# Patient Record
Sex: FEMALE | Race: WHITE | Employment: OTHER | URBAN - NONMETROPOLITAN AREA
[De-identification: names, ages, dates, MRNs, and addresses within clinical notes are randomized per-mention and may not be internally consistent; named-entity substitution may affect disease eponyms.]

---

## 2017-01-09 ENCOUNTER — OFFICE VISIT (OUTPATIENT)
Dept: FAMILY MEDICINE CLINIC | Age: 52
End: 2017-01-09

## 2017-01-09 VITALS
SYSTOLIC BLOOD PRESSURE: 104 MMHG | BODY MASS INDEX: 22.68 KG/M2 | WEIGHT: 122 LBS | TEMPERATURE: 97.1 F | HEART RATE: 84 BPM | RESPIRATION RATE: 12 BRPM | DIASTOLIC BLOOD PRESSURE: 60 MMHG

## 2017-01-09 DIAGNOSIS — E55.9 VITAMIN D DEFICIENCY: ICD-10-CM

## 2017-01-09 DIAGNOSIS — F79 MENTAL RETARDATION: ICD-10-CM

## 2017-01-09 DIAGNOSIS — F20.9 SCHIZOPHRENIA, UNSPECIFIED TYPE (HCC): ICD-10-CM

## 2017-01-09 DIAGNOSIS — K59.04 CHRONIC IDIOPATHIC CONSTIPATION: ICD-10-CM

## 2017-01-09 DIAGNOSIS — F42.9 OCD (OBSESSIVE COMPULSIVE DISORDER): Primary | ICD-10-CM

## 2017-01-09 PROCEDURE — 99214 OFFICE O/P EST MOD 30 MIN: CPT | Performed by: NURSE PRACTITIONER

## 2017-01-09 RX ORDER — DIVALPROEX SODIUM 500 MG/1
TABLET, DELAYED RELEASE ORAL
Qty: 31 TABLET | Refills: 5 | Status: SHIPPED | OUTPATIENT
Start: 2017-01-09 | End: 2017-01-11 | Stop reason: SDUPTHER

## 2017-01-09 RX ORDER — THIORIDAZINE HYDROCHLORIDE 25 MG/1
TABLET, FILM COATED ORAL
Qty: 93 TABLET | Refills: 5 | Status: SHIPPED | OUTPATIENT
Start: 2017-01-09 | End: 2017-06-15 | Stop reason: SDUPTHER

## 2017-01-09 RX ORDER — SERTRALINE HYDROCHLORIDE 100 MG/1
TABLET, FILM COATED ORAL
Qty: 31 TABLET | Refills: 5 | Status: SHIPPED | OUTPATIENT
Start: 2017-01-09 | End: 2017-06-15 | Stop reason: SDUPTHER

## 2017-01-09 RX ORDER — DIVALPROEX SODIUM 250 MG/1
TABLET, DELAYED RELEASE ORAL
Qty: 62 TABLET | Refills: 5 | Status: SHIPPED | OUTPATIENT
Start: 2017-01-09 | End: 2017-01-11 | Stop reason: SDUPTHER

## 2017-01-09 ASSESSMENT — ENCOUNTER SYMPTOMS
GASTROINTESTINAL NEGATIVE: 1
RESPIRATORY NEGATIVE: 1

## 2017-01-11 ENCOUNTER — TELEPHONE (OUTPATIENT)
Dept: FAMILY MEDICINE CLINIC | Age: 52
End: 2017-01-11

## 2017-01-11 RX ORDER — DIVALPROEX SODIUM 250 MG/1
TABLET, DELAYED RELEASE ORAL
Qty: 62 TABLET | Refills: 5
Start: 2017-01-11 | End: 2017-04-05 | Stop reason: SDUPTHER

## 2017-01-11 RX ORDER — DIVALPROEX SODIUM 500 MG/1
TABLET, DELAYED RELEASE ORAL
Qty: 31 TABLET | Refills: 5
Start: 2017-01-11 | End: 2017-04-05 | Stop reason: DRUGHIGH

## 2017-01-13 RX ORDER — ERGOCALCIFEROL 1.25 MG/1
CAPSULE ORAL
Qty: 5 CAPSULE | Refills: 0 | Status: SHIPPED | OUTPATIENT
Start: 2017-01-13 | End: 2017-02-15 | Stop reason: SDUPTHER

## 2017-01-17 ENCOUNTER — NURSE ONLY (OUTPATIENT)
Dept: FAMILY MEDICINE CLINIC | Age: 52
End: 2017-01-17

## 2017-01-17 DIAGNOSIS — F42.9 OCD (OBSESSIVE COMPULSIVE DISORDER): ICD-10-CM

## 2017-01-17 LAB — VALPROIC ACID LEVEL: 110.8 UG/ML (ref 50–100)

## 2017-01-17 PROCEDURE — 36415 COLL VENOUS BLD VENIPUNCTURE: CPT | Performed by: NURSE PRACTITIONER

## 2017-01-18 ENCOUNTER — TELEPHONE (OUTPATIENT)
Dept: FAMILY MEDICINE CLINIC | Age: 52
End: 2017-01-18

## 2017-04-05 RX ORDER — DIVALPROEX SODIUM 250 MG/1
TABLET, DELAYED RELEASE ORAL
Qty: 62 TABLET | Refills: 5 | Status: SHIPPED | OUTPATIENT
Start: 2017-04-05 | End: 2017-08-25 | Stop reason: SDUPTHER

## 2017-04-10 ENCOUNTER — OFFICE VISIT (OUTPATIENT)
Dept: FAMILY MEDICINE CLINIC | Age: 52
End: 2017-04-10

## 2017-04-10 VITALS
HEIGHT: 61 IN | HEART RATE: 88 BPM | BODY MASS INDEX: 22.84 KG/M2 | DIASTOLIC BLOOD PRESSURE: 52 MMHG | WEIGHT: 121 LBS | OXYGEN SATURATION: 97 % | SYSTOLIC BLOOD PRESSURE: 106 MMHG

## 2017-04-10 DIAGNOSIS — F20.9 SCHIZOPHRENIA, UNSPECIFIED TYPE (HCC): ICD-10-CM

## 2017-04-10 DIAGNOSIS — E55.9 VITAMIN D DEFICIENCY: ICD-10-CM

## 2017-04-10 DIAGNOSIS — F42.9 OCD (OBSESSIVE COMPULSIVE DISORDER): ICD-10-CM

## 2017-04-10 DIAGNOSIS — Z12.31 ENCOUNTER FOR SCREENING MAMMOGRAM FOR MALIGNANT NEOPLASM OF BREAST: ICD-10-CM

## 2017-04-10 DIAGNOSIS — F79 MENTAL RETARDATION: ICD-10-CM

## 2017-04-10 DIAGNOSIS — D64.9 ANEMIA, UNSPECIFIED TYPE: ICD-10-CM

## 2017-04-10 DIAGNOSIS — E74.89 OTHER SPECIFIED DISORDERS OF CARBOHYDRATE METABOLISM (HCC): ICD-10-CM

## 2017-04-10 DIAGNOSIS — Z13.29 SCREENING FOR HYPOTHYROIDISM: ICD-10-CM

## 2017-04-10 DIAGNOSIS — E78.5 HYPERLIPIDEMIA, UNSPECIFIED HYPERLIPIDEMIA TYPE: ICD-10-CM

## 2017-04-10 DIAGNOSIS — Z13.9 SCREENING: Primary | ICD-10-CM

## 2017-04-10 LAB
TSH REFLEX: 0.47 UIU/ML (ref 0.27–4.2)
VALPROIC ACID LEVEL: 48.7 UG/ML (ref 50–100)

## 2017-04-10 PROCEDURE — 36415 COLL VENOUS BLD VENIPUNCTURE: CPT | Performed by: NURSE PRACTITIONER

## 2017-04-10 PROCEDURE — G8427 DOCREV CUR MEDS BY ELIG CLIN: HCPCS | Performed by: NURSE PRACTITIONER

## 2017-04-10 PROCEDURE — G8420 CALC BMI NORM PARAMETERS: HCPCS | Performed by: NURSE PRACTITIONER

## 2017-04-10 PROCEDURE — 99213 OFFICE O/P EST LOW 20 MIN: CPT | Performed by: NURSE PRACTITIONER

## 2017-04-10 PROCEDURE — 3014F SCREEN MAMMO DOC REV: CPT | Performed by: NURSE PRACTITIONER

## 2017-04-10 PROCEDURE — 1036F TOBACCO NON-USER: CPT | Performed by: NURSE PRACTITIONER

## 2017-04-10 PROCEDURE — 3017F COLORECTAL CA SCREEN DOC REV: CPT | Performed by: NURSE PRACTITIONER

## 2017-04-10 ASSESSMENT — PATIENT HEALTH QUESTIONNAIRE - PHQ9
SUM OF ALL RESPONSES TO PHQ QUESTIONS 1-9: 0
SUM OF ALL RESPONSES TO PHQ9 QUESTIONS 1 & 2: 0
1. LITTLE INTEREST OR PLEASURE IN DOING THINGS: 0
2. FEELING DOWN, DEPRESSED OR HOPELESS: 0

## 2017-04-10 ASSESSMENT — ENCOUNTER SYMPTOMS
RESPIRATORY NEGATIVE: 1
GASTROINTESTINAL NEGATIVE: 1

## 2017-04-11 ENCOUNTER — TELEPHONE (OUTPATIENT)
Dept: FAMILY MEDICINE CLINIC | Age: 52
End: 2017-04-11

## 2017-06-05 ENCOUNTER — HOSPITAL ENCOUNTER (OUTPATIENT)
Dept: MAMMOGRAPHY | Age: 52
Discharge: HOME | End: 2017-06-05
Attending: NURSE PRACTITIONER | Admitting: NURSE PRACTITIONER

## 2017-06-05 DIAGNOSIS — Z12.31 ENCOUNTER FOR SCREENING MAMMOGRAM FOR MALIGNANT NEOPLASM OF BREAST: ICD-10-CM

## 2017-06-05 DIAGNOSIS — Z13.9 SCREENING: ICD-10-CM

## 2017-06-07 DIAGNOSIS — R92.8 ABNORMAL MAMMOGRAM OF RIGHT BREAST: Primary | ICD-10-CM

## 2017-06-15 RX ORDER — THIORIDAZINE HYDROCHLORIDE 25 MG/1
TABLET, FILM COATED ORAL
Qty: 93 TABLET | Refills: 3 | Status: SHIPPED | OUTPATIENT
Start: 2017-06-15 | End: 2017-10-05 | Stop reason: SDUPTHER

## 2017-06-15 RX ORDER — SERTRALINE HYDROCHLORIDE 100 MG/1
TABLET, FILM COATED ORAL
Qty: 31 TABLET | Refills: 3 | Status: SHIPPED | OUTPATIENT
Start: 2017-06-15 | End: 2017-10-05 | Stop reason: SDUPTHER

## 2017-07-06 RX ORDER — ERGOCALCIFEROL 1.25 MG/1
CAPSULE ORAL
Qty: 5 CAPSULE | Refills: 0 | Status: SHIPPED | OUTPATIENT
Start: 2017-07-06 | End: 2017-07-31 | Stop reason: SDUPTHER

## 2017-07-21 ENCOUNTER — NURSE ONLY (OUTPATIENT)
Dept: MAMMOGRAPHY | Age: 52
End: 2017-07-21

## 2017-07-21 NOTE — PROGRESS NOTES
Outreach to patient ([phone]), guardian for patient Marcio Tran [phone]) and to [facility] office (PCP). Patient was recommended for diagnostic mammograms, PCP coordinated with [facility] to contact central scheduling for an appointment. An appointment was made and then cancelled but not rescheduled. Left message for the patient. Attempted to contact the guardian, no answer and VM is full so was unable to leave a message. Spoke with PCP office to confirm numbers for patient. Will make another attempt to reach patient.

## 2017-07-31 RX ORDER — ERGOCALCIFEROL 1.25 MG/1
CAPSULE ORAL
Qty: 5 CAPSULE | Refills: 0 | Status: SHIPPED | OUTPATIENT
Start: 2017-07-31 | End: 2017-10-05 | Stop reason: SDUPTHER

## 2017-09-11 ENCOUNTER — HOSPITAL ENCOUNTER (OUTPATIENT)
Dept: MAMMOGRAPHY | Age: 52
Discharge: HOME | End: 2017-09-11
Attending: NURSE PRACTITIONER | Admitting: NURSE PRACTITIONER

## 2017-09-11 DIAGNOSIS — R92.8 ABNORMAL MAMMOGRAM, UNSPECIFIED: ICD-10-CM

## 2017-09-26 RX ORDER — LOPERAMIDE HYDROCHLORIDE 2 MG/1
CAPSULE ORAL
Qty: 12 CAPSULE | Refills: 0 | Status: SHIPPED | OUTPATIENT
Start: 2017-09-26 | End: 2018-03-06 | Stop reason: SDUPTHER

## 2017-09-26 RX ORDER — ALUMINUM HYDROXIDE, MAGNESIUM HYDROXIDE, SIMETHICONE 800; 800; 80 MG/10ML; MG/10ML; MG/10ML
SUSPENSION ORAL
Qty: 355 ML | Refills: 0 | Status: SHIPPED | OUTPATIENT
Start: 2017-09-26 | End: 2017-12-11 | Stop reason: SDUPTHER

## 2017-09-26 RX ORDER — MAGNESIUM HYDROXIDE 1200 MG/15ML
SUSPENSION ORAL
Qty: 355 ML | Refills: 0 | Status: SHIPPED | OUTPATIENT
Start: 2017-09-26 | End: 2018-03-06 | Stop reason: SDUPTHER

## 2017-09-26 RX ORDER — MAG/ALUMINUM/SOD BICARB/ALGINC 20 MG-80MG
TABLET,CHEWABLE ORAL
Qty: 360 TABLET | Refills: 0 | Status: SHIPPED | OUTPATIENT
Start: 2017-09-26

## 2017-09-26 RX ORDER — IBUPROFEN 200 MG
CAPSULE ORAL
Qty: 28.4 G | Refills: 0 | Status: SHIPPED | OUTPATIENT
Start: 2017-09-26 | End: 2018-03-06 | Stop reason: SDUPTHER

## 2017-09-26 RX ORDER — LACTULOSE 10 G/15ML
SOLUTION ORAL
Qty: 473 ML | Refills: 0 | Status: SHIPPED | OUTPATIENT
Start: 2017-09-26 | End: 2018-03-06 | Stop reason: SDUPTHER

## 2017-09-26 RX ORDER — PSEUDOEPHEDRINE HCL 30 MG/1
TABLET, FILM COATED ORAL
Qty: 24 TABLET | Refills: 0 | Status: SHIPPED | OUTPATIENT
Start: 2017-09-26 | End: 2018-03-06 | Stop reason: SDUPTHER

## 2017-10-05 NOTE — TELEPHONE ENCOUNTER
Refill Request for - Vitamin D 27070, Thioridazine 25mg, sertraline 100    Last visit- 4/10/17    Told to follow up- 7/10/17     Pending appointment- None     Additional Comments - sent to Amita Lira

## 2017-10-09 RX ORDER — SERTRALINE HYDROCHLORIDE 100 MG/1
TABLET, FILM COATED ORAL
Qty: 31 TABLET | Refills: 0 | Status: SHIPPED | OUTPATIENT
Start: 2017-10-09 | End: 2017-11-14 | Stop reason: SDUPTHER

## 2017-10-09 RX ORDER — ERGOCALCIFEROL 1.25 MG/1
CAPSULE ORAL
Qty: 4 CAPSULE | Refills: 0 | Status: SHIPPED | OUTPATIENT
Start: 2017-10-09 | End: 2017-12-26 | Stop reason: SDUPTHER

## 2017-10-09 RX ORDER — THIORIDAZINE HYDROCHLORIDE 25 MG/1
TABLET, FILM COATED ORAL
Qty: 93 TABLET | Refills: 0 | Status: SHIPPED | OUTPATIENT
Start: 2017-10-09 | End: 2017-11-14 | Stop reason: SDUPTHER

## 2017-10-11 ENCOUNTER — HOSPITAL ENCOUNTER (OUTPATIENT)
Dept: OTHER | Age: 52
Discharge: HOME | End: 2017-10-11
Attending: NURSE PRACTITIONER | Admitting: NURSE PRACTITIONER

## 2017-10-11 LAB
A/G RATIO: 1.4 (ref 1.1–2.2)
ALBUMIN SERPL-MCNC: 4.3 G/DL (ref 3.4–5)
ALP BLD-CCNC: 78 U/L (ref 40–129)
ALT SERPL-CCNC: 10 U/L (ref 10–40)
AMPHETAMINE SCREEN, URINE: NORMAL
ANION GAP SERPL CALCULATED.3IONS-SCNC: 10 MMOL/L (ref 3–16)
AST SERPL-CCNC: 13 U/L (ref 15–37)
BARBITURATE SCREEN URINE: NORMAL
BASOPHILS ABSOLUTE: 0 K/UL (ref 0–0.2)
BASOPHILS RELATIVE PERCENT: 0.5 %
BENZODIAZEPINE SCREEN, URINE: NORMAL
BILIRUB SERPL-MCNC: 0.3 MG/DL (ref 0–1)
BUN BLDV-MCNC: 9 MG/DL (ref 7–20)
CALCIUM SERPL-MCNC: 9.5 MG/DL (ref 8.3–10.6)
CANNABINOID SCREEN URINE: NORMAL
CHLORIDE BLD-SCNC: 101 MMOL/L (ref 99–110)
CHOLESTEROL, FASTING: 191 MG/DL (ref 0–199)
CO2: 29 MMOL/L (ref 21–32)
COCAINE METABOLITE SCREEN URINE: NORMAL
CREAT SERPL-MCNC: <0.5 MG/DL (ref 0.6–1.1)
EOSINOPHILS ABSOLUTE: 0 K/UL (ref 0–0.6)
EOSINOPHILS RELATIVE PERCENT: 0.5 %
FOLATE: 17.83 NG/ML (ref 4.78–24.2)
GFR AFRICAN AMERICAN: >60
GFR NON-AFRICAN AMERICAN: >60
GLOBULIN: 3 G/DL
GLUCOSE FASTING: 89 MG/DL (ref 70–99)
HCT VFR BLD CALC: 39.2 % (ref 36–48)
HDLC SERPL-MCNC: 91 MG/DL (ref 40–60)
HEMOGLOBIN: 13.1 G/DL (ref 12–16)
IRON SATURATION: 28 % (ref 15–50)
IRON: 93 UG/DL (ref 37–145)
LDL CHOLESTEROL CALCULATED: 86 MG/DL
LYMPHOCYTES ABSOLUTE: 1.8 K/UL (ref 1–5.1)
LYMPHOCYTES RELATIVE PERCENT: 39.3 %
Lab: NORMAL
MCH RBC QN AUTO: 29.8 PG (ref 26–34)
MCHC RBC AUTO-ENTMCNC: 33.4 G/DL (ref 31–36)
MCV RBC AUTO: 89.4 FL (ref 80–100)
METHADONE SCREEN, URINE: NORMAL
MONOCYTES ABSOLUTE: 0.3 K/UL (ref 0–1.3)
MONOCYTES RELATIVE PERCENT: 6.6 %
NEUTROPHILS ABSOLUTE: 2.5 K/UL (ref 1.7–7.7)
NEUTROPHILS RELATIVE PERCENT: 53.1 %
OPIATE SCREEN URINE: NORMAL
OXYCODONE URINE: NORMAL
PDW BLD-RTO: 13.1 % (ref 12.4–15.4)
PH UA: 7
PHENCYCLIDINE SCREEN URINE: NORMAL
PLATELET # BLD: 192 K/UL (ref 135–450)
PMV BLD AUTO: 8.6 FL (ref 5–10.5)
POTASSIUM SERPL-SCNC: 4.9 MMOL/L (ref 3.5–5.1)
PROPOXYPHENE SCREEN: NORMAL
RBC # BLD: 4.38 M/UL (ref 4–5.2)
SODIUM BLD-SCNC: 140 MMOL/L (ref 136–145)
TOTAL IRON BINDING CAPACITY: 333 UG/DL (ref 260–445)
TOTAL PROTEIN: 7.3 G/DL (ref 6.4–8.2)
TRIGLYCERIDE, FASTING: 70 MG/DL (ref 0–150)
TSH REFLEX: 0.88 UIU/ML (ref 0.27–4.2)
VITAMIN B-12: 794 PG/ML (ref 211–911)
VITAMIN D 25-HYDROXY: 56.2 NG/ML
VLDLC SERPL CALC-MCNC: 14 MG/DL
WBC # BLD: 4.6 K/UL (ref 4–11)

## 2017-10-12 LAB
ESTIMATED AVERAGE GLUCOSE: 99.7 MG/DL
HBA1C MFR BLD: 5.1 %

## 2017-10-19 LAB
BUPRENORPHINE GLUCURONIDE URINE: <5 NG/ML
BUPRENORPHINE URINE: <2 NG/ML
NALOXONE URINE: <100 NG/ML
NORBUPRENORPHINE GLUCURONIDE URINE: <5 NG/ML
NORBUPRENORPHINE, URINE: <2 NG/ML

## 2017-11-15 RX ORDER — THIORIDAZINE HYDROCHLORIDE 25 MG/1
TABLET, FILM COATED ORAL
Qty: 93 TABLET | Refills: 0 | Status: SHIPPED | OUTPATIENT
Start: 2017-11-15 | End: 2017-12-12 | Stop reason: SDUPTHER

## 2017-11-15 RX ORDER — SERTRALINE HYDROCHLORIDE 100 MG/1
TABLET, FILM COATED ORAL
Qty: 31 TABLET | Refills: 0 | Status: SHIPPED | OUTPATIENT
Start: 2017-11-15 | End: 2017-12-12 | Stop reason: SDUPTHER

## 2017-12-11 ENCOUNTER — OFFICE VISIT (OUTPATIENT)
Dept: FAMILY MEDICINE CLINIC | Age: 52
End: 2017-12-11

## 2017-12-11 VITALS
BODY MASS INDEX: 24.73 KG/M2 | SYSTOLIC BLOOD PRESSURE: 98 MMHG | HEART RATE: 95 BPM | WEIGHT: 131 LBS | OXYGEN SATURATION: 99 % | DIASTOLIC BLOOD PRESSURE: 60 MMHG | HEIGHT: 61 IN

## 2017-12-11 DIAGNOSIS — F20.9 SCHIZOPHRENIA, UNSPECIFIED TYPE (HCC): ICD-10-CM

## 2017-12-11 DIAGNOSIS — Z23 NEED FOR INFLUENZA VACCINATION: Primary | ICD-10-CM

## 2017-12-11 DIAGNOSIS — F79 MENTAL RETARDATION: ICD-10-CM

## 2017-12-11 DIAGNOSIS — E78.5 HYPERLIPIDEMIA, UNSPECIFIED HYPERLIPIDEMIA TYPE: ICD-10-CM

## 2017-12-11 PROCEDURE — 99213 OFFICE O/P EST LOW 20 MIN: CPT | Performed by: NURSE PRACTITIONER

## 2017-12-11 PROCEDURE — 90688 IIV4 VACCINE SPLT 0.5 ML IM: CPT | Performed by: NURSE PRACTITIONER

## 2017-12-11 PROCEDURE — G0008 ADMIN INFLUENZA VIRUS VAC: HCPCS | Performed by: NURSE PRACTITIONER

## 2017-12-11 PROCEDURE — 3017F COLORECTAL CA SCREEN DOC REV: CPT | Performed by: NURSE PRACTITIONER

## 2017-12-11 PROCEDURE — 3014F SCREEN MAMMO DOC REV: CPT | Performed by: NURSE PRACTITIONER

## 2017-12-11 PROCEDURE — G8420 CALC BMI NORM PARAMETERS: HCPCS | Performed by: NURSE PRACTITIONER

## 2017-12-11 PROCEDURE — 1036F TOBACCO NON-USER: CPT | Performed by: NURSE PRACTITIONER

## 2017-12-11 PROCEDURE — G8427 DOCREV CUR MEDS BY ELIG CLIN: HCPCS | Performed by: NURSE PRACTITIONER

## 2017-12-11 PROCEDURE — G8484 FLU IMMUNIZE NO ADMIN: HCPCS | Performed by: NURSE PRACTITIONER

## 2017-12-11 ASSESSMENT — ENCOUNTER SYMPTOMS
RESPIRATORY NEGATIVE: 1
EYES NEGATIVE: 1

## 2017-12-11 NOTE — PROGRESS NOTES
Subjective:      Patient ID: Chirag Boswell is a 45 y.o. female. HPI  Chief Complaint   Patient presents with    Schizophrenia   Here for check up and medication review. Mood have been stable and has had recent issues at the group home. Is taking her medications as directed. She denies any specific complaints today. Review of Systems   Constitutional: Negative. HENT: Negative. Eyes: Negative. Respiratory: Negative. Endocrine: Negative. Genitourinary: Negative. Musculoskeletal: Negative. Skin: Negative. Neurological: Negative. Hematological: Negative. Psychiatric/Behavioral: Negative. BP 98/60   Pulse 95   Ht 5' 0.98\" (1.549 m)   Wt 131 lb (59.4 kg)   LMP 04/12/2014   SpO2 99%   BMI 24.77 kg/m²     Objective:   Physical Exam   Constitutional: She is oriented to person, place, and time. She appears well-developed and well-nourished. No distress. HENT:   Head: Normocephalic and atraumatic. Right Ear: External ear normal.   Left Ear: External ear normal.   Nose: Nose normal.   Mouth/Throat: Oropharynx is clear and moist. No oropharyngeal exudate. Eyes: Conjunctivae and EOM are normal. Pupils are equal, round, and reactive to light. Right eye exhibits no discharge. Left eye exhibits no discharge. No scleral icterus. Neck: Neck supple. Carotid bruit is not present. No thyromegaly present. Cardiovascular: Normal rate, regular rhythm, normal heart sounds and intact distal pulses. Pulmonary/Chest: Effort normal and breath sounds normal.   Abdominal: Soft. Bowel sounds are normal.   Musculoskeletal: Normal range of motion. Neurological: She is alert and oriented to person, place, and time. She has normal reflexes. No cranial nerve deficit. She exhibits normal muscle tone. Coordination normal.   Skin: Skin is warm and dry. Psychiatric: She has a normal mood and affect.  Her behavior is normal. Judgment and thought content normal.   Nursing note and vitals reviewed. Assessment:      1. Schizophrenia  2. Hyperlipidemia  3. MRDD      Plan:      All conditions are stable, behavior is well controlled on current medications. Will continue on her current meds. Flu shot will also be given today. RTC in 6 months or sooner as needed. Caregiver agrees with plan.

## 2017-12-11 NOTE — PATIENT INSTRUCTIONS
season. But even when the vaccine doesn't exactly match these viruses, it may still provide some protection. Flu vaccine cannot prevent:  · Flu that is caused by a virus not covered by the vaccine. · Illnesses that look like flu but are not. Some people should not get this vaccine  Tell the person who is giving you the vaccine:  · If you have any severe (life-threatening) allergies. If you ever had a life-threatening allergic reaction after a dose of flu vaccine, or have a severe allergy to any part of this vaccine, you may be advised not to get vaccinated. Most, but not all, types of flu vaccine contain a small amount of egg protein. · If you ever had Guillain-Barré syndrome (also called GBS) Some people with a history of GBS should not get this vaccine. This should be discussed with your doctor. · If you are not feeling well. It is usually okay to get flu vaccine when you have a mild illness, but you might be asked to come back when you feel better. Risks of a vaccine reaction  With any medicine, including vaccines, there is a chance of reactions. These are usually mild and go away on their own, but serious reactions are also possible. Most people who get a flu shot do not have any problems with it. Minor problems following a flu shot include:  · Soreness, redness, or swelling where the shot was given  · Hoarseness  · Sore, red or itchy eyes  · Cough  · Fever  · Aches  · Headache  · Itching  · Fatigue  If these problems occur, they usually begin soon after the shot and last 1 or 2 days. More serious problems following a flu shot can include the following:  · There may be a small increased risk of Guillain-Barré Syndrome (GBS) after inactivated flu vaccine. This risk has been estimated at 1 or 2 additional cases per million people vaccinated. This is much lower than the risk of severe complications from flu, which can be prevented by flu vaccine.   · Daune Better children who get the flu shot along with [phone]. [facility] does not give medical advice. The National Vaccine Injury Compensation Program  The National Vaccine Injury Compensation Program (VICP) is a federal program that was created to compensate people who may have been injured by certain vaccines. Persons who believe they may have been injured by a vaccine can learn about the program and about filing a claim by calling 9-412.408.6543 or visiting the [employer] website at www.[facility].gov/vaccinecompensation. There is a time limit to file a claim for compensation. How can I learn more? · Ask your healthcare provider. He or she can give you the vaccine package insert or suggest other sources of information. · Call your local or state health department. · Contact the Centers for Disease Control and Prevention (CDC):  ¨ Call 5-398.308.3389 (1-800-CDC-INFO) or  ¨ Visit CDC's website at www.cdc.gov/flu  Vaccine Information Statement  Inactivated Influenza Vaccine  8/7/2015)  42 OSVALDO Hebert Maximo 273LH-10  Department of Health and Human Services  Centers for Disease Control and Prevention  Many Vaccine Information Statements are available in [place] and other languages. See www.immunize.org/vis. Muchas hojas de información sobre vacunas están disponibles en español y en otros idiomas. Visite www.immunize.org/vis. Care instructions adapted under license by [facility] ([facility]). If you have questions about a medical condition or this instruction, always ask your healthcare professional. [place] [address] any warranty or liability for your use of this information. Patient Education        Learning About High Cholesterol  What is high cholesterol? Cholesterol is a type of fat in your blood. It is needed for many body functions, such as making new cells. Cholesterol is made by your body. It also comes from food you eat. If you have too much cholesterol, it starts to build up in your arteries. This is called hardening of the arteries, or atherosclerosis.

## 2017-12-13 RX ORDER — THIORIDAZINE HYDROCHLORIDE 25 MG/1
TABLET, FILM COATED ORAL
Qty: 93 TABLET | Refills: 0 | Status: SHIPPED | OUTPATIENT
Start: 2017-12-13 | End: 2017-12-26 | Stop reason: SDUPTHER

## 2017-12-13 RX ORDER — SERTRALINE HYDROCHLORIDE 100 MG/1
TABLET, FILM COATED ORAL
Qty: 31 TABLET | Refills: 0 | Status: SHIPPED | OUTPATIENT
Start: 2017-12-13 | End: 2017-12-26 | Stop reason: SDUPTHER

## 2017-12-28 RX ORDER — SERTRALINE HYDROCHLORIDE 100 MG/1
TABLET, FILM COATED ORAL
Qty: 31 TABLET | Refills: 5 | Status: SHIPPED | OUTPATIENT
Start: 2017-12-28 | End: 2018-06-26 | Stop reason: SDUPTHER

## 2017-12-28 RX ORDER — THIORIDAZINE HYDROCHLORIDE 25 MG/1
TABLET, FILM COATED ORAL
Qty: 93 TABLET | Refills: 5 | Status: SHIPPED | OUTPATIENT
Start: 2017-12-28 | End: 2018-01-09 | Stop reason: RX

## 2017-12-28 RX ORDER — ERGOCALCIFEROL 1.25 MG/1
CAPSULE ORAL
Qty: 4 CAPSULE | Refills: 5 | Status: SHIPPED | OUTPATIENT
Start: 2017-12-28 | End: 2018-06-26 | Stop reason: SDUPTHER

## 2018-01-09 RX ORDER — OLANZAPINE 10 MG/1
10 TABLET ORAL NIGHTLY
Qty: 30 TABLET | Refills: 0 | OUTPATIENT
Start: 2018-01-09 | End: 2018-01-29 | Stop reason: SDUPTHER

## 2018-01-29 ENCOUNTER — OFFICE VISIT (OUTPATIENT)
Dept: FAMILY MEDICINE CLINIC | Age: 53
End: 2018-01-29

## 2018-01-29 VITALS
DIASTOLIC BLOOD PRESSURE: 65 MMHG | TEMPERATURE: 98.7 F | SYSTOLIC BLOOD PRESSURE: 92 MMHG | HEART RATE: 69 BPM | BODY MASS INDEX: 25.71 KG/M2 | WEIGHT: 136 LBS | OXYGEN SATURATION: 97 %

## 2018-01-29 DIAGNOSIS — K59.04 CHRONIC IDIOPATHIC CONSTIPATION: ICD-10-CM

## 2018-01-29 DIAGNOSIS — F20.9 SCHIZOPHRENIA, UNSPECIFIED TYPE (HCC): Primary | ICD-10-CM

## 2018-01-29 DIAGNOSIS — F20.9 CHRONIC SCHIZOPHRENIA (HCC): ICD-10-CM

## 2018-01-29 DIAGNOSIS — F42.9 OBSESSIVE-COMPULSIVE DISORDER, UNSPECIFIED TYPE: ICD-10-CM

## 2018-01-29 DIAGNOSIS — E55.9 VITAMIN D DEFICIENCY: ICD-10-CM

## 2018-01-29 DIAGNOSIS — H61.23 EXCESSIVE CERUMEN IN BOTH EAR CANALS: ICD-10-CM

## 2018-01-29 DIAGNOSIS — H61.23 BILATERAL IMPACTED CERUMEN: ICD-10-CM

## 2018-01-29 PROCEDURE — 3017F COLORECTAL CA SCREEN DOC REV: CPT | Performed by: NURSE PRACTITIONER

## 2018-01-29 PROCEDURE — G8484 FLU IMMUNIZE NO ADMIN: HCPCS | Performed by: NURSE PRACTITIONER

## 2018-01-29 PROCEDURE — 69210 REMOVE IMPACTED EAR WAX UNI: CPT | Performed by: NURSE PRACTITIONER

## 2018-01-29 PROCEDURE — 99214 OFFICE O/P EST MOD 30 MIN: CPT | Performed by: NURSE PRACTITIONER

## 2018-01-29 PROCEDURE — G8427 DOCREV CUR MEDS BY ELIG CLIN: HCPCS | Performed by: NURSE PRACTITIONER

## 2018-01-29 PROCEDURE — 3014F SCREEN MAMMO DOC REV: CPT | Performed by: NURSE PRACTITIONER

## 2018-01-29 PROCEDURE — 1036F TOBACCO NON-USER: CPT | Performed by: NURSE PRACTITIONER

## 2018-01-29 PROCEDURE — G8419 CALC BMI OUT NRM PARAM NOF/U: HCPCS | Performed by: NURSE PRACTITIONER

## 2018-01-29 RX ORDER — OLANZAPINE 10 MG/1
10 TABLET ORAL NIGHTLY
Qty: 30 TABLET | Refills: 6 | Status: SHIPPED | OUTPATIENT
Start: 2018-01-29 | End: 2018-07-27 | Stop reason: SDUPTHER

## 2018-01-29 RX ORDER — DIVALPROEX SODIUM 250 MG/1
TABLET, DELAYED RELEASE ORAL
Qty: 62 TABLET | Refills: 6 | Status: SHIPPED | OUTPATIENT
Start: 2018-01-29 | End: 2018-08-27 | Stop reason: SDUPTHER

## 2018-01-29 ASSESSMENT — PATIENT HEALTH QUESTIONNAIRE - PHQ9
2. FEELING DOWN, DEPRESSED OR HOPELESS: 0
1. LITTLE INTEREST OR PLEASURE IN DOING THINGS: 0
SUM OF ALL RESPONSES TO PHQ QUESTIONS 1-9: 0
SUM OF ALL RESPONSES TO PHQ9 QUESTIONS 1 & 2: 0

## 2018-01-29 ASSESSMENT — ENCOUNTER SYMPTOMS
ALLERGIC/IMMUNOLOGIC NEGATIVE: 1
EYES NEGATIVE: 1
RESPIRATORY NEGATIVE: 1

## 2018-01-29 NOTE — PROGRESS NOTES
Subjective:      Patient ID: Muna Maguire is a 45 y.o. female. HPI  Chief Complaint   Patient presents with    Medication Check     had to switch to a new medication      Here for 6 month check up. Caregiver states no new issues. Behaviors are will controlled. Thorazine was changes to Zyprexa a couple of weeks ago due to the pharmacy being unable to get thorazine any more. No ODC symptoms no behavior issues, or crying spells. Taking meds as directed. BM every 1-2 days, no blood in stool. Review of Systems   HENT: Negative. Eyes: Negative. Respiratory: Negative. Cardiovascular: Negative. Endocrine: Negative. Genitourinary: Negative. Musculoskeletal: Negative. Skin: Negative. Allergic/Immunologic: Negative. Neurological: Negative. Hematological: Negative. Psychiatric/Behavioral: Negative. Objective:   Physical Exam   Constitutional: She is oriented to person, place, and time. She appears well-developed and well-nourished. No distress. HENT:   Head: Normocephalic and atraumatic. Right Ear: External ear normal.   Left Ear: External ear normal.   Nose: Nose normal.   Mouth/Throat: Oropharynx is clear and moist. No oropharyngeal exudate. Unable to visualize either TM due to impacted cerumen. Eyes: Conjunctivae and EOM are normal. Pupils are equal, round, and reactive to light. Right eye exhibits no discharge. Left eye exhibits no discharge. No scleral icterus. Fundoscopic exam:       The right eye shows no arteriolar narrowing, no AV nicking, no exudate, no hemorrhage and no papilledema. The left eye shows no arteriolar narrowing, no AV nicking, no exudate, no hemorrhage and no papilledema. Neck: Normal range of motion. Neck supple. Carotid bruit is not present. No thyromegaly present. Cardiovascular: Normal rate, regular rhythm, normal heart sounds and intact distal pulses. No murmur heard.   Pulmonary/Chest: Effort normal and breath sounds

## 2018-01-29 NOTE — PATIENT INSTRUCTIONS
link.  Current as of: March 20, 2017  Content Version: 11.5  © 3789-8357 [employer]. Care instructions adapted under license by [facility] ([facility]). If you have questions about a medical condition or this instruction, always ask your healthcare professional. [address] any warranty or liability for your use of this information. Patient Education        Learning About Vitamin D  Why is it important to get enough vitamin D? Your body needs vitamin D to absorb calcium. Calcium keeps your bones and muscles, including your heart, healthy and strong. If your muscles don't get enough calcium, they can cramp, hurt, or feel weak. You may have long-term (chronic) muscle aches and pains. If you don't get enough vitamin D throughout life, you have an increased chance of having thin and brittle bones (osteoporosis) in your later years. Children who don't get enough vitamin D may not grow as much as others their age. They also have a chance of getting a rare disease called rickets. It causes weak bones. Vitamin D and calcium are added to many foods. And your body uses sunshine to make its own vitamin D. How much vitamin D do you need? The [place] [employer] recommends that people ages 3 through 79 get 600 IU (international units) every day. Adults 71 and older need 800 IU every day. Blood tests for vitamin D can check your vitamin D level. But there is no standard normal range used by all laboratories. The [place] [employer] recommends a blood level of 20 ng/mL of vitamin D for healthy bones. And most people in the [place] and [facility] ([facility]) meet this goal.  How can you get more vitamin D? Foods that contain vitamin D include:  · [place], tuna, and mackerel. These are some of the best foods to eat when you need to get more vitamin D.  · Cheese, egg yolks, and beef liver. These foods have vitamin D in small amounts.   · Milk, soy drinks, orange juice, yogurt, margarine, and some

## 2018-03-06 RX ORDER — IBUPROFEN 200 MG
TABLET ORAL
Qty: 120 TABLET | Refills: 0 | Status: SHIPPED | OUTPATIENT
Start: 2018-03-06 | End: 2019-04-16 | Stop reason: SDUPTHER

## 2018-03-06 RX ORDER — LOPERAMIDE HYDROCHLORIDE 2 MG/1
CAPSULE ORAL
Qty: 12 CAPSULE | Refills: 0 | Status: SHIPPED | OUTPATIENT
Start: 2018-03-06

## 2018-03-06 RX ORDER — ACETAMINOPHEN 325 MG/1
TABLET ORAL
Qty: 120 TABLET | Refills: 0 | Status: SHIPPED | OUTPATIENT
Start: 2018-03-06

## 2018-03-06 RX ORDER — MAGNESIUM HYDROXIDE 1200 MG/15ML
SUSPENSION ORAL
Qty: 355 ML | Refills: 0 | Status: SHIPPED | OUTPATIENT
Start: 2018-03-06

## 2018-03-06 RX ORDER — BENZOCAINE/MENTHOL 6 MG-10 MG
LOZENGE MUCOUS MEMBRANE
Qty: 30 LOZENGE | Refills: 3 | Status: SHIPPED | OUTPATIENT
Start: 2018-03-06

## 2018-03-06 RX ORDER — IBUPROFEN 200 MG
CAPSULE ORAL
Qty: 28.4 G | Refills: 0 | Status: SHIPPED | OUTPATIENT
Start: 2018-03-06

## 2018-03-06 RX ORDER — GUAIFENESIN 100 MG/5ML
LIQUID ORAL
Qty: 1200 ML | Refills: 0 | Status: SHIPPED | OUTPATIENT
Start: 2018-03-06

## 2018-03-06 RX ORDER — ALUMINUM HYDROXIDE, MAGNESIUM HYDROXIDE, SIMETHICONE 800; 800; 80 MG/10ML; MG/10ML; MG/10ML
SUSPENSION ORAL
Qty: 355 ML | Refills: 0 | Status: SHIPPED | OUTPATIENT
Start: 2018-03-06

## 2018-03-06 RX ORDER — LACTULOSE 10 G/15ML
SOLUTION ORAL
Qty: 473 ML | Refills: 0 | Status: SHIPPED | OUTPATIENT
Start: 2018-03-06

## 2018-03-06 RX ORDER — PSEUDOEPHEDRINE HCL 30 MG/1
TABLET, FILM COATED ORAL
Qty: 24 TABLET | Refills: 0 | Status: SHIPPED | OUTPATIENT
Start: 2018-03-06

## 2018-03-06 RX ORDER — IODINE/SODIUM IODIDE 2 %
TINCTURE TOPICAL
Qty: 177 ML | Refills: 0 | Status: SHIPPED | OUTPATIENT
Start: 2018-03-06

## 2018-03-06 NOTE — TELEPHONE ENCOUNTER
Refill request for Antacid, Imodium, milk of magnesia, neomycin     Last visit- 1/29/18    Pending visit - none     Told to follow up

## 2018-06-13 ENCOUNTER — TELEPHONE (OUTPATIENT)
Dept: FAMILY MEDICINE CLINIC | Age: 53
End: 2018-06-13

## 2018-06-28 RX ORDER — SERTRALINE HYDROCHLORIDE 100 MG/1
TABLET, FILM COATED ORAL
Qty: 31 TABLET | Refills: 3 | Status: SHIPPED | OUTPATIENT
Start: 2018-06-28 | End: 2018-10-02 | Stop reason: SDUPTHER

## 2018-06-28 RX ORDER — ERGOCALCIFEROL 1.25 MG/1
CAPSULE ORAL
Qty: 4 CAPSULE | Refills: 3 | Status: SHIPPED | OUTPATIENT
Start: 2018-06-28 | End: 2018-10-01 | Stop reason: SDUPTHER

## 2018-07-02 ENCOUNTER — OFFICE VISIT (OUTPATIENT)
Dept: FAMILY MEDICINE CLINIC | Age: 53
End: 2018-07-02

## 2018-07-02 VITALS
DIASTOLIC BLOOD PRESSURE: 68 MMHG | HEIGHT: 61 IN | HEART RATE: 91 BPM | TEMPERATURE: 99.1 F | WEIGHT: 153 LBS | SYSTOLIC BLOOD PRESSURE: 100 MMHG | BODY MASS INDEX: 28.89 KG/M2 | OXYGEN SATURATION: 97 %

## 2018-07-02 DIAGNOSIS — F79 MENTAL RETARDATION: Primary | ICD-10-CM

## 2018-07-02 DIAGNOSIS — Z02.89 PHYSICAL EXAM FOR CAMP: ICD-10-CM

## 2018-07-02 PROCEDURE — 99213 OFFICE O/P EST LOW 20 MIN: CPT | Performed by: NURSE PRACTITIONER

## 2018-07-02 ASSESSMENT — ENCOUNTER SYMPTOMS
CHEST TIGHTNESS: 0
DIARRHEA: 0
CONSTIPATION: 0
ABDOMINAL PAIN: 0
SHORTNESS OF BREATH: 0

## 2018-07-02 NOTE — PROGRESS NOTES
[facility]  [facility] [address]  [address] [address]  Dept: [phone]  Dept Fax: [phone]    Delilah Miles is a 52 y.o. female who presents today for her medical conditions/complaints as noted below. Delilah Miles is c/o of H&P    Chief Complaint   Patient presents with    H&P       HPI:     No current complaints from caregiver or patient. Here for physcial for camp. Subjective:     Review of Systems   Constitutional: Negative for activity change, appetite change, fatigue and fever. Respiratory: Negative for chest tightness and shortness of breath. Cardiovascular: Negative for chest pain and palpitations. Gastrointestinal: Negative for abdominal pain, constipation and diarrhea. Genitourinary: Negative for difficulty urinating and frequency. Hematological: Does not bruise/bleed easily. Objective:     Vitals:    07/02/18 1552   BP: 100/68   Site: Right Arm   Position: Sitting   Cuff Size: Large Adult   Pulse: 91   Temp: 99.1 °F (37.3 °C)   TempSrc: Oral   SpO2: 97%   Weight: 153 lb (69.4 kg)   Height: 5' 1\" (1.549 m)       Physical Exam   Constitutional: Vital signs are normal. She appears well-developed and well-nourished. HENT:   Head: Normocephalic and atraumatic. Right Ear: Hearing and external ear normal.   Left Ear: Hearing and external ear normal.   Nose: Nose normal.   Eyes: Lids are normal. Pupils are equal, round, and reactive to light. Neck: Normal range of motion. Cardiovascular: Normal rate, regular rhythm, S1 normal, S2 normal, normal heart sounds and normal pulses. No extrasystoles are present. PMI is not displaced. Exam reveals no gallop, no S3, no S4, no distant heart sounds and no friction rub. No murmur heard. No systolic murmur is present    No diastolic murmur is present   Pulmonary/Chest: Effort normal and breath sounds normal. No accessory muscle usage. No respiratory distress.  She has no decreased breath sounds. She has no wheezes. She has no rhonchi. She has no rales. Abdominal: Soft. Normal appearance and bowel sounds are normal.   Neurological: She is alert. Skin: Skin is warm, dry and intact. Psychiatric: She has a normal mood and affect. Her speech is normal.     Assessment & Plan: The following diagnoses and conditions are stable with no further orders unless indicated:    1. Physical exam for [place]      Jose Galeano was seen today for h&p. Diagnoses and all orders for this visit:    Physical exam for [place]    Cleared for [place] participation. Return if symptoms worsen or fail to improve. Patient should call the office immediately with new or ongoing signs or symptoms or worsening, or proceed to the emergency room. If you are on medications which could impair your senses, you are at risk of weakness, falls, dizziness, or drowsiness. You should be careful during activities which could place you at risk of harm, such as climbing, using stairs, operating machinery, or driving vehicles. If you feel you cannot safely do these activities, you should request others to help you, or avoid the activities altogether. If you are drowsy for any other reason, you should use the same precautions as listed above. Call if pattern of symptoms change or persists for an extended time.     BRIGETTE Vickers

## 2018-07-26 ENCOUNTER — TELEPHONE (OUTPATIENT)
Dept: FAMILY MEDICINE CLINIC | Age: 53
End: 2018-07-26

## 2018-07-26 NOTE — TELEPHONE ENCOUNTER
For DOS 7/2/18 a Wellness exam cannot be bill because she has Medicare and there is not enough documentation to bill a Medicare AWV. Can we add the diagnosis of Mental Retardation since that is the reason for attending that camp?

## 2018-07-30 RX ORDER — OLANZAPINE 10 MG/1
10 TABLET ORAL NIGHTLY
Qty: 30 TABLET | Refills: 0 | Status: SHIPPED | OUTPATIENT
Start: 2018-07-30 | End: 2018-09-10 | Stop reason: SDUPTHER

## 2018-08-27 RX ORDER — DIVALPROEX SODIUM 250 MG/1
TABLET, DELAYED RELEASE ORAL
Qty: 56 TABLET | Refills: 3 | Status: SHIPPED | OUTPATIENT
Start: 2018-08-27 | End: 2018-11-12 | Stop reason: SDUPTHER

## 2018-08-28 DIAGNOSIS — F20.9 SCHIZOPHRENIA, UNSPECIFIED TYPE (HCC): Primary | ICD-10-CM

## 2018-08-28 NOTE — TELEPHONE ENCOUNTER
Left msg for Enoch Williamson to call back so we can tell her that Vivian Spencer needs blood work  I put the order in [address] already for it

## 2018-09-10 RX ORDER — OLANZAPINE 10 MG/1
10 TABLET ORAL NIGHTLY
Qty: 30 TABLET | Refills: 0 | Status: SHIPPED | OUTPATIENT
Start: 2018-09-10 | End: 2018-10-02 | Stop reason: SDUPTHER

## 2018-10-01 RX ORDER — ERGOCALCIFEROL 1.25 MG/1
CAPSULE ORAL
Qty: 4 CAPSULE | Refills: 0 | Status: SHIPPED | OUTPATIENT
Start: 2018-10-01 | End: 2018-11-12 | Stop reason: SDUPTHER

## 2018-10-01 NOTE — TELEPHONE ENCOUNTER
Refill Request for vitamin D (ERGOCALCIFEROL) 11643 units CAPS capsule     Last visit- 7/2/18    Told to follow up- none    Pending appointment- None    Additional Comments -

## 2018-10-08 RX ORDER — SERTRALINE HYDROCHLORIDE 100 MG/1
TABLET, FILM COATED ORAL
Qty: 31 TABLET | Refills: 0 | Status: SHIPPED | OUTPATIENT
Start: 2018-10-08 | End: 2018-11-12 | Stop reason: SDUPTHER

## 2018-10-08 RX ORDER — OLANZAPINE 10 MG/1
10 TABLET ORAL NIGHTLY
Qty: 30 TABLET | Refills: 0 | Status: SHIPPED | OUTPATIENT
Start: 2018-10-08 | End: 2018-11-12 | Stop reason: SDUPTHER

## 2018-11-12 ENCOUNTER — OFFICE VISIT (OUTPATIENT)
Dept: FAMILY MEDICINE CLINIC | Age: 53
End: 2018-11-12
Payer: MEDICARE

## 2018-11-12 ENCOUNTER — TELEPHONE (OUTPATIENT)
Dept: FAMILY MEDICINE CLINIC | Age: 53
End: 2018-11-12

## 2018-11-12 VITALS
SYSTOLIC BLOOD PRESSURE: 92 MMHG | WEIGHT: 156.8 LBS | HEART RATE: 81 BPM | BODY MASS INDEX: 29.6 KG/M2 | DIASTOLIC BLOOD PRESSURE: 60 MMHG | HEIGHT: 61 IN | OXYGEN SATURATION: 98 % | TEMPERATURE: 97.5 F

## 2018-11-12 DIAGNOSIS — Z00.00 WELL ADULT HEALTH CHECK: Primary | ICD-10-CM

## 2018-11-12 DIAGNOSIS — Z23 NEED FOR PROPHYLACTIC VACCINATION AND INOCULATION AGAINST VARICELLA: ICD-10-CM

## 2018-11-12 DIAGNOSIS — Z23 NEEDS FLU SHOT: ICD-10-CM

## 2018-11-12 PROCEDURE — G8482 FLU IMMUNIZE ORDER/ADMIN: HCPCS | Performed by: NURSE PRACTITIONER

## 2018-11-12 PROCEDURE — G0439 PPPS, SUBSEQ VISIT: HCPCS | Performed by: NURSE PRACTITIONER

## 2018-11-12 PROCEDURE — 90682 RIV4 VACC RECOMBINANT DNA IM: CPT | Performed by: NURSE PRACTITIONER

## 2018-11-12 PROCEDURE — G0008 ADMIN INFLUENZA VIRUS VAC: HCPCS | Performed by: NURSE PRACTITIONER

## 2018-11-12 RX ORDER — DIVALPROEX SODIUM 250 MG/1
TABLET, DELAYED RELEASE ORAL
Qty: 56 TABLET | Refills: 3 | Status: SHIPPED | OUTPATIENT
Start: 2018-11-12 | End: 2019-03-11 | Stop reason: SDUPTHER

## 2018-11-12 RX ORDER — ERGOCALCIFEROL 1.25 MG/1
CAPSULE ORAL
Qty: 4 CAPSULE | Refills: 3 | Status: SHIPPED | OUTPATIENT
Start: 2018-11-12 | End: 2019-03-11 | Stop reason: SDUPTHER

## 2018-11-12 RX ORDER — OLANZAPINE 10 MG/1
10 TABLET ORAL NIGHTLY
Qty: 30 TABLET | Refills: 3 | Status: SHIPPED | OUTPATIENT
Start: 2018-11-12 | End: 2019-03-11 | Stop reason: SDUPTHER

## 2018-11-12 RX ORDER — THIORIDAZINE HYDROCHLORIDE 25 MG/1
25 TABLET, FILM COATED ORAL 3 TIMES DAILY
COMMUNITY

## 2018-11-12 RX ORDER — SERTRALINE HYDROCHLORIDE 100 MG/1
TABLET, FILM COATED ORAL
Qty: 31 TABLET | Refills: 3 | Status: SHIPPED | OUTPATIENT
Start: 2018-11-12 | End: 2019-03-11 | Stop reason: SDUPTHER

## 2018-11-12 ASSESSMENT — ENCOUNTER SYMPTOMS
SHORTNESS OF BREATH: 0
WHEEZING: 0
DIARRHEA: 0
NAUSEA: 0
SORE THROAT: 0
COUGH: 0
CONSTIPATION: 0
VOMITING: 0
SINUS PAIN: 0

## 2018-11-12 NOTE — PROGRESS NOTES
OF MAGNESIA 400 MG/5ML suspension TAKE 30ML BY MOUTH AT BEDTIME AS NEEDED  IF NO BM FOR 2 DAYS (CONSTIPATION) 355 mL 0    Neomycin-Bacitracin-Polymyxin (TRIPLE ANTIBIOTIC) 3.5-400-5000 OINT APPLY TOPICALLY TO CUTS, ABRASIONS, SCRAPES, OR SCRATCHES 4 TIMES DAILY AS NEEDED 28.4 g 0    lactulose (CHRONULAC) 10 GM/15ML solution TAKE 1 TABLESPOONFUL BY MOUTH 3 TIMES A DAY AS NEEDED FOR CONSTIPATION 473 mL 0    SUDOGEST 30 MG tablet TAKE 2 TABLETS BY MOUTH EVERY 4 HOURS AS NEEDED FOR CONGESTION 24 tablet 0    SILTUSSIN  MG/5ML syrup TAKE 10ML BY MOUTH EVERY 4 HOURS AS NEEDED FOR COUGH 1200 mL 0    MAPAP 325 MG tablet Take two (2) tablets orally (by mouth) every 4 hours as needed for pain  HEADACHE, OR ELEVATED TEMPERATURE 120 tablet 0    ibuprofen (ADVIL;MOTRIN) 200 MG tablet Take two (2) tablets orally (by mouth) every 4 hours as needed for pain 120 tablet 0    Calamine 8-8 % LOTN lotion APPLY TOPICALLY TO AFFECTED AREA 3 TIMES DAILY AS NEEDED FOR MILD SKINIRRITATION OR RASHES 177 mL 0    CHLORASEPTIC 6-10 MG LOZG lozenge USE 1 LOZENGE BY MOUTH EVERY 2 HOURS AS NEEDED FOR SORE THROAT 30 lozenge 3    ANTACID 500 MG chewable tablet CHEW 2 TABLETS BY MOUTH 6 TIMES DAILY AS NEEDED FOR HEARTBURN 360 tablet 0    Sunscreen SPF50 LOTN APPLY TOPICALLY 3 TIMES DAILY AS NEEDED FOR INSECT REPELLANT 1 Bottle 5    Ca Carbonate-Mag Hydroxide (ANTACID) 550-110 MG CHEW Take 2 tablets by mouth 6 times daily 360 tablet 5    Diethyltoluamide (JOHNSON INSECT REPELLENT) 30 % AERO Apply 1 each topically three times daily 1 Can 5    Disposable Gloves (VINYL GLOVES ONE SIZE) MISC 2 each by Does not apply route 4 times daily 200 each 5    Disposable Gloves (LATEX GLOVES) MISC Use as directed 200 each 5     No current facility-administered medications for this visit. No Known Allergies    Subjective:      Review of Systems   Constitutional: Negative for chills and fever. HENT: Negative for sinus pain and sore throat. Charly Ventura was seen today for wellness program.    Diagnoses and all orders for this visit:    Well adult health check    Needs flu shot  -     Influenza, Quadv, Recombinant, 18 years and older, IM, PF, Prefill Syr or SDV, 0.5mL (FLUBLOK QUADV, PF)    Need for prophylactic vaccination and inoculation against varicella  -     zoster recombinant adjuvanted vaccine (SHINGRIX) 50 MCG/0.5ML SUSR injection; Inject 0.5 mLs into the muscle once for 1 dose 50 MCG IM then repeat 2-6 months. Other orders  -     OLANZapine (ZYPREXA) 10 MG tablet; Take 1 tablet by mouth nightly  -     sertraline (ZOLOFT) 100 MG tablet; TAKE 1 TABLET DAILY AT 5PM FOR MANIC EPISODES  -     vitamin D (ERGOCALCIFEROL) 08634 units CAPS capsule; TAKE 1 CAPSULE BY MOUTH EVERY WEEK ON WEDNESDAYS  -     divalproex (DEPAKOTE) 250 MG DR tablet; Take one (1) tablet orally (by mouth) twice a day. First dose in morning and 2nd dose in evening. Return in about 1 year (around 11/12/2019), or as needed. Patientshould call the office immediately with new or ongoing signs or symptoms or worsening, or proceed to the emergency room. If you are on medications which could impair your senses, you are at risk of weakness, falls,dizziness, or drowsiness. You should be careful during activities which could place you at risk of harm, such as climbing, using stairs, operating machinery, or driving vehicles. If you feel you cannot safely do theseactivities, you should request others to help you, or avoid the activities altogether. If you are drowsy for any other reason, you should use the same precautions as listed above. Call if pattern of symptoms change or persists for an extended time.     Kathie Hanley

## 2019-01-31 ENCOUNTER — TELEPHONE (OUTPATIENT)
Dept: FAMILY MEDICINE CLINIC | Age: 54
End: 2019-01-31

## 2019-02-04 ENCOUNTER — TELEPHONE (OUTPATIENT)
Dept: FAMILY MEDICINE CLINIC | Age: 54
End: 2019-02-04

## 2019-02-04 DIAGNOSIS — R26.89 NEED FOR ASSISTANCE DUE TO UNSTEADY GAIT: ICD-10-CM

## 2019-02-04 DIAGNOSIS — F79 MENTAL RETARDATION: Primary | ICD-10-CM

## 2019-03-11 RX ORDER — SERTRALINE HYDROCHLORIDE 100 MG/1
TABLET, FILM COATED ORAL
Qty: 28 TABLET | Refills: 0 | Status: SHIPPED | OUTPATIENT
Start: 2019-03-11 | End: 2019-04-25 | Stop reason: SDUPTHER

## 2019-03-11 RX ORDER — DIVALPROEX SODIUM 250 MG/1
TABLET, DELAYED RELEASE ORAL
Qty: 56 TABLET | Refills: 0 | Status: SHIPPED | OUTPATIENT
Start: 2019-03-11 | End: 2019-03-29 | Stop reason: SDUPTHER

## 2019-03-11 RX ORDER — OLANZAPINE 10 MG/1
10 TABLET ORAL NIGHTLY
Qty: 28 TABLET | Refills: 0 | Status: SHIPPED | OUTPATIENT
Start: 2019-03-11 | End: 2019-04-25 | Stop reason: SDUPTHER

## 2019-03-11 RX ORDER — ERGOCALCIFEROL 1.25 MG/1
CAPSULE ORAL
Qty: 4 CAPSULE | Refills: 0 | Status: SHIPPED | OUTPATIENT
Start: 2019-03-11 | End: 2019-03-29 | Stop reason: SDUPTHER

## 2019-03-29 RX ORDER — DIVALPROEX SODIUM 250 MG/1
TABLET, DELAYED RELEASE ORAL
Qty: 56 TABLET | Refills: 6 | Status: SHIPPED | OUTPATIENT
Start: 2019-03-29 | End: 2019-10-22 | Stop reason: SDUPTHER

## 2019-03-29 RX ORDER — ERGOCALCIFEROL 1.25 MG/1
CAPSULE ORAL
Qty: 4 CAPSULE | Refills: 6 | Status: SHIPPED | OUTPATIENT
Start: 2019-03-29 | End: 2019-10-22 | Stop reason: SDUPTHER

## 2019-04-16 RX ORDER — IBUPROFEN 200 MG
TABLET ORAL
Qty: 120 TABLET | Refills: 5 | Status: SHIPPED | OUTPATIENT
Start: 2019-04-16

## 2019-04-25 RX ORDER — OLANZAPINE 10 MG/1
10 TABLET ORAL NIGHTLY
Qty: 28 TABLET | Refills: 0 | Status: SHIPPED | OUTPATIENT
Start: 2019-04-25 | End: 2019-05-24 | Stop reason: SDUPTHER

## 2019-04-25 RX ORDER — SERTRALINE HYDROCHLORIDE 100 MG/1
TABLET, FILM COATED ORAL
Qty: 28 TABLET | Refills: 0 | Status: SHIPPED | OUTPATIENT
Start: 2019-04-25 | End: 2019-05-24 | Stop reason: SDUPTHER

## 2019-04-25 NOTE — TELEPHONE ENCOUNTER
Refill Request for OLANZapine (ZYPREXA) 10 MG tablet   sertraline (ZOLOFT) 100 MG tablet     Last visit- 11/12/18    Told to follow up- 1 year    Pending appointment- None    Additional Comments -

## 2019-06-03 RX ORDER — SERTRALINE HYDROCHLORIDE 100 MG/1
TABLET, FILM COATED ORAL
Qty: 28 TABLET | Refills: 0 | Status: SHIPPED | OUTPATIENT
Start: 2019-06-03 | End: 2019-07-19 | Stop reason: SDUPTHER

## 2019-06-03 RX ORDER — OLANZAPINE 10 MG/1
10 TABLET ORAL NIGHTLY
Qty: 28 TABLET | Refills: 0 | Status: SHIPPED | OUTPATIENT
Start: 2019-06-03 | End: 2019-07-19 | Stop reason: SDUPTHER

## 2019-07-23 ENCOUNTER — HOSPITAL ENCOUNTER (OUTPATIENT)
Dept: MAMMOGRAPHY | Age: 54
Discharge: HOME | End: 2019-07-23
Payer: MEDICARE

## 2019-07-23 DIAGNOSIS — Z12.31 ENCOUNTER FOR SCREENING MAMMOGRAM FOR BREAST CANCER: ICD-10-CM

## 2019-07-23 PROCEDURE — 77063 BREAST TOMOSYNTHESIS BI: CPT

## 2019-10-10 ENCOUNTER — TELEPHONE (OUTPATIENT)
Dept: FAMILY MEDICINE CLINIC | Age: 54
End: 2019-10-10

## 2019-10-10 DIAGNOSIS — Z01.10 ENCOUNTER FOR HEARING EXAMINATION, UNSPECIFIED WHETHER ABNORMAL FINDINGS: Primary | ICD-10-CM

## 2019-10-24 RX ORDER — SERTRALINE HYDROCHLORIDE 100 MG/1
TABLET, FILM COATED ORAL
Qty: 56 TABLET | Refills: 0 | Status: SHIPPED | OUTPATIENT
Start: 2019-10-24 | End: 2019-11-20 | Stop reason: SDUPTHER

## 2019-10-24 RX ORDER — ERGOCALCIFEROL 1.25 MG/1
CAPSULE ORAL
Qty: 4 CAPSULE | Refills: 0 | Status: SHIPPED | OUTPATIENT
Start: 2019-10-24 | End: 2019-11-20 | Stop reason: SDUPTHER

## 2019-10-24 RX ORDER — DIVALPROEX SODIUM 250 MG/1
TABLET, DELAYED RELEASE ORAL
Qty: 56 TABLET | Refills: 0 | Status: SHIPPED | OUTPATIENT
Start: 2019-10-24 | End: 2019-11-20 | Stop reason: SDUPTHER

## 2019-10-24 RX ORDER — OLANZAPINE 10 MG/1
10 TABLET ORAL NIGHTLY
Qty: 56 TABLET | Refills: 0 | Status: SHIPPED | OUTPATIENT
Start: 2019-10-24 | End: 2019-11-20 | Stop reason: SDUPTHER

## 2019-11-04 ENCOUNTER — HOSPITAL ENCOUNTER (OUTPATIENT)
Dept: ULTRASOUND IMAGING | Age: 54
Discharge: HOME | End: 2019-11-04
Payer: MEDICARE

## 2019-11-04 DIAGNOSIS — R10.2 PELVIC PAIN IN FEMALE: ICD-10-CM

## 2019-11-04 PROCEDURE — 76856 US EXAM PELVIC COMPLETE: CPT

## 2019-11-21 ENCOUNTER — TELEPHONE (OUTPATIENT)
Dept: FAMILY MEDICINE CLINIC | Age: 54
End: 2019-11-21

## 2019-11-26 ENCOUNTER — OFFICE VISIT (OUTPATIENT)
Dept: FAMILY MEDICINE CLINIC | Age: 54
End: 2019-11-26
Payer: MEDICARE

## 2019-11-26 VITALS
DIASTOLIC BLOOD PRESSURE: 75 MMHG | TEMPERATURE: 95.4 F | BODY MASS INDEX: 30.44 KG/M2 | WEIGHT: 161 LBS | SYSTOLIC BLOOD PRESSURE: 108 MMHG | OXYGEN SATURATION: 93 % | HEART RATE: 89 BPM

## 2019-11-26 DIAGNOSIS — J10.1 INFLUENZA A: ICD-10-CM

## 2019-11-26 DIAGNOSIS — J02.9 SORE THROAT: ICD-10-CM

## 2019-11-26 DIAGNOSIS — R05.9 COUGH: Primary | ICD-10-CM

## 2019-11-26 DIAGNOSIS — Z20.828 EXPOSURE TO INFLUENZA: ICD-10-CM

## 2019-11-26 LAB
INFLUENZA A ANTIBODY: POSITIVE
INFLUENZA B ANTIBODY: NEGATIVE

## 2019-11-26 PROCEDURE — 1036F TOBACCO NON-USER: CPT | Performed by: FAMILY MEDICINE

## 2019-11-26 PROCEDURE — 3017F COLORECTAL CA SCREEN DOC REV: CPT | Performed by: FAMILY MEDICINE

## 2019-11-26 PROCEDURE — G8428 CUR MEDS NOT DOCUMENT: HCPCS | Performed by: FAMILY MEDICINE

## 2019-11-26 PROCEDURE — G8484 FLU IMMUNIZE NO ADMIN: HCPCS | Performed by: FAMILY MEDICINE

## 2019-11-26 PROCEDURE — 99213 OFFICE O/P EST LOW 20 MIN: CPT | Performed by: FAMILY MEDICINE

## 2019-11-26 PROCEDURE — G8417 CALC BMI ABV UP PARAM F/U: HCPCS | Performed by: FAMILY MEDICINE

## 2019-11-26 PROCEDURE — 87804 INFLUENZA ASSAY W/OPTIC: CPT | Performed by: FAMILY MEDICINE

## 2019-11-26 RX ORDER — OSELTAMIVIR PHOSPHATE 75 MG/1
75 CAPSULE ORAL 2 TIMES DAILY
Qty: 10 CAPSULE | Refills: 0 | Status: SHIPPED | OUTPATIENT
Start: 2019-11-26 | End: 2019-12-01

## 2019-11-27 ASSESSMENT — ENCOUNTER SYMPTOMS
SHORTNESS OF BREATH: 0
SORE THROAT: 1
COUGH: 1
GASTROINTESTINAL NEGATIVE: 1
TROUBLE SWALLOWING: 1

## 2019-12-09 ENCOUNTER — OFFICE VISIT (OUTPATIENT)
Dept: FAMILY MEDICINE CLINIC | Age: 54
End: 2019-12-09
Payer: MEDICARE

## 2019-12-09 VITALS
RESPIRATION RATE: 16 BRPM | OXYGEN SATURATION: 96 % | BODY MASS INDEX: 30.02 KG/M2 | WEIGHT: 159 LBS | HEART RATE: 85 BPM | HEIGHT: 61 IN | DIASTOLIC BLOOD PRESSURE: 74 MMHG | TEMPERATURE: 98.6 F | SYSTOLIC BLOOD PRESSURE: 112 MMHG

## 2019-12-09 DIAGNOSIS — J11.1 INFLUENZA: Primary | ICD-10-CM

## 2019-12-09 PROCEDURE — 3017F COLORECTAL CA SCREEN DOC REV: CPT | Performed by: NURSE PRACTITIONER

## 2019-12-09 PROCEDURE — G8427 DOCREV CUR MEDS BY ELIG CLIN: HCPCS | Performed by: NURSE PRACTITIONER

## 2019-12-09 PROCEDURE — 99213 OFFICE O/P EST LOW 20 MIN: CPT | Performed by: NURSE PRACTITIONER

## 2019-12-09 PROCEDURE — G8417 CALC BMI ABV UP PARAM F/U: HCPCS | Performed by: NURSE PRACTITIONER

## 2019-12-09 PROCEDURE — 1036F TOBACCO NON-USER: CPT | Performed by: NURSE PRACTITIONER

## 2019-12-09 PROCEDURE — G8484 FLU IMMUNIZE NO ADMIN: HCPCS | Performed by: NURSE PRACTITIONER

## 2019-12-09 ASSESSMENT — PATIENT HEALTH QUESTIONNAIRE - PHQ9
1. LITTLE INTEREST OR PLEASURE IN DOING THINGS: 0
SUM OF ALL RESPONSES TO PHQ9 QUESTIONS 1 & 2: 0
SUM OF ALL RESPONSES TO PHQ QUESTIONS 1-9: 0
DEPRESSION UNABLE TO ASSESS: FUNCTIONAL CAPACITY MOTIVATION LIMITS ACCURACY
2. FEELING DOWN, DEPRESSED OR HOPELESS: 0
SUM OF ALL RESPONSES TO PHQ QUESTIONS 1-9: 0

## 2019-12-10 ASSESSMENT — ENCOUNTER SYMPTOMS
RESPIRATORY NEGATIVE: 1
EYES NEGATIVE: 1
GASTROINTESTINAL NEGATIVE: 1

## 2019-12-21 RX ORDER — DIVALPROEX SODIUM 250 MG/1
TABLET, DELAYED RELEASE ORAL
Qty: 60 TABLET | Refills: 3 | Status: SHIPPED | OUTPATIENT
Start: 2019-12-21 | End: 2020-04-13

## 2020-02-20 ENCOUNTER — TELEPHONE (OUTPATIENT)
Dept: FAMILY MEDICINE CLINIC | Age: 55
End: 2020-02-20

## 2020-02-20 ENCOUNTER — HOSPITAL ENCOUNTER (EMERGENCY)
Age: 55
Discharge: HOME | End: 2020-02-20
Attending: EMERGENCY MEDICINE
Payer: MEDICARE

## 2020-02-20 ENCOUNTER — APPOINTMENT (OUTPATIENT)
Dept: GENERAL RADIOLOGY | Age: 55
End: 2020-02-20
Payer: MEDICARE

## 2020-02-20 VITALS
BODY MASS INDEX: 29.66 KG/M2 | HEART RATE: 85 BPM | HEIGHT: 61 IN | SYSTOLIC BLOOD PRESSURE: 127 MMHG | DIASTOLIC BLOOD PRESSURE: 79 MMHG | RESPIRATION RATE: 16 BRPM | WEIGHT: 157.13 LBS | OXYGEN SATURATION: 98 % | TEMPERATURE: 98 F

## 2020-02-20 LAB
BACTERIA: ABNORMAL /HPF
BILIRUBIN URINE: NEGATIVE
BLOOD, URINE: ABNORMAL
CLARITY: ABNORMAL
COLOR: YELLOW
EPITHELIAL CELLS, UA: ABNORMAL /HPF (ref 0–5)
GLUCOSE URINE: NEGATIVE MG/DL
KETONES, URINE: ABNORMAL MG/DL
LEUKOCYTE ESTERASE, URINE: ABNORMAL
MICROSCOPIC EXAMINATION: YES
NITRITE, URINE: NEGATIVE
PH UA: 7.5 (ref 5–8)
PROTEIN UA: NEGATIVE MG/DL
RBC UA: ABNORMAL /HPF (ref 0–4)
SPECIFIC GRAVITY UA: 1.02 (ref 1–1.03)
URINE TYPE: ABNORMAL
UROBILINOGEN, URINE: 0.2 E.U./DL
WBC UA: ABNORMAL /HPF (ref 0–5)

## 2020-02-20 PROCEDURE — 81001 URINALYSIS AUTO W/SCOPE: CPT

## 2020-02-20 PROCEDURE — 74018 RADEX ABDOMEN 1 VIEW: CPT

## 2020-02-20 PROCEDURE — 99284 EMERGENCY DEPT VISIT MOD MDM: CPT

## 2020-02-20 RX ORDER — PHENAZOPYRIDINE HYDROCHLORIDE 200 MG/1
200 TABLET, FILM COATED ORAL 3 TIMES DAILY PRN
Qty: 6 TABLET | Refills: 0 | Status: SHIPPED | OUTPATIENT
Start: 2020-02-20 | End: 2020-02-22

## 2020-02-20 RX ORDER — CEFUROXIME AXETIL 500 MG/1
500 TABLET ORAL 2 TIMES DAILY
Qty: 14 TABLET | Refills: 0 | Status: SHIPPED | OUTPATIENT
Start: 2020-02-20 | End: 2020-02-27

## 2020-02-20 ASSESSMENT — PAIN DESCRIPTION - LOCATION: LOCATION: ABDOMEN

## 2020-02-20 ASSESSMENT — ENCOUNTER SYMPTOMS
CHEST TIGHTNESS: 0
BACK PAIN: 0
SHORTNESS OF BREATH: 0
ABDOMINAL PAIN: 0

## 2020-02-20 ASSESSMENT — PAIN - FUNCTIONAL ASSESSMENT: PAIN_FUNCTIONAL_ASSESSMENT: PREVENTS OR INTERFERES SOME ACTIVE ACTIVITIES AND ADLS

## 2020-02-20 ASSESSMENT — PAIN DESCRIPTION - PROGRESSION: CLINICAL_PROGRESSION: GRADUALLY WORSENING

## 2020-02-20 ASSESSMENT — PAIN DESCRIPTION - FREQUENCY: FREQUENCY: CONTINUOUS

## 2020-02-20 ASSESSMENT — PAIN DESCRIPTION - PAIN TYPE: TYPE: ACUTE PAIN

## 2020-02-20 ASSESSMENT — PAIN DESCRIPTION - ONSET: ONSET: SUDDEN

## 2020-02-20 ASSESSMENT — PAIN DESCRIPTION - ORIENTATION: ORIENTATION: MID;LOWER

## 2020-02-20 NOTE — TELEPHONE ENCOUNTER
Patient is very constipated, stomach is very extended and hard. Patient crying because she can't go. tried milk of mag yesterday with no results.  Asking if they can try a fleet enema

## 2020-02-20 NOTE — ED NOTES
Bladder scanner completed pt has approx 250 ml in bladder dr Zambrano Flies aware.  Caregiver at bedside     Bandar Quintanilla, BRITTNEY  02/20/20 5815

## 2020-02-20 NOTE — TELEPHONE ENCOUNTER
Spoke with [facility]ene- they are going to take patient to the ER. She is scheduled to see loretta Monday.

## 2020-02-20 NOTE — ED PROVIDER NOTES
[facility]      Pt Name: Teresa Lozano  MRN: [id number]  [place] 1965  Date of evaluation: 2/20/2020  Provider: Robel Wells MD    [address] [place] [address]       Chief Complaint   Patient presents with    Abdominal Pain     mid lower abdominal pain with no bowel movements x 4 days, Last movment was small amount of round hard stool per caregiver, last normal was 10 days ago. Patient affirms pain to urination. pt caregiver states they have given m.o.m. over past two days with no result         HISTORY OF PRESENT ILLNESS   (Location/Symptom, Timing/Onset, Context/Setting, Quality, Duration, Modifying Factors, Severity)  Note limiting factors. Teresa Lozano is a 62 y.o. female who presents to the emergency department     This patient presents to the emergency department she does have a history of MR and is cognitively impaired so is hard to get a full history. Her caretaker says that for the last 24 hours she is complained of some mild suprapubic discomfort. There is been no high fever no vomiting. No history of any previous abdominal surgeries  Patient on physical exam does not appear to be in acute distress she is sitting up appears to be comfortable there is no vomiting lung sounds are clear cardiac exam is normal abdomen reveals no enlarged liver or spleen no focal tenderness no peritoneal irritation no tenderness over McBurney's point    The history is provided by the patient and a caregiver. Nursing Notes were reviewed. REVIEW OF SYSTEMS    (2-9 systems for level 4, 10 or more for level 5)     Review of Systems   Constitutional: Positive for activity change and appetite change. Negative for chills, diaphoresis, fatigue and fever. HENT: Negative for congestion. Respiratory: Negative for chest tightness and shortness of breath. Cardiovascular: Negative for chest pain. Gastrointestinal: Negative for abdominal pain.    Genitourinary: LACTULOSE (CHRONULAC) 10 GM/15ML SOLUTION    TAKE 1 TABLESPOONFUL BY MOUTH 3 TIMES A DAY AS NEEDED FOR CONSTIPATION    LOPERAMIDE (IMODIUM) 2 MG CAPSULE    TAKE 2 TABLETS STAT THEN 1 TABLET 4 TIMES DAILY AS NEEDED FOR DIARRHEA    MAPAP 325 MG TABLET    Take two (2) tablets orally (by mouth) every 4 hours as needed for pain  HEADACHE, OR ELEVATED TEMPERATURE    MILK OF MAGNESIA 400 MG/5ML SUSPENSION    TAKE 30ML BY MOUTH AT BEDTIME AS NEEDED  IF NO BM FOR 2 DAYS (CONSTIPATION)    MISC. DEVICES (WALL GRAB BAR) MISC    Dispense 1 grab bar to be used in shower to prevent falls    NEOMYCIN-BACITRACIN-POLYMYXIN (TRIPLE ANTIBIOTIC) 3.5-400-5000 OINT    APPLY TOPICALLY TO CUTS, ABRASIONS, SCRAPES, OR SCRATCHES 4 TIMES DAILY AS NEEDED    OLANZAPINE (ZYPREXA) 10 MG TABLET    TAKE 1 TABLET BY MOUTH NIGHTLY    SERTRALINE (ZOLOFT) 100 MG TABLET    TAKE 1 TABLET BY MOUTH DAILY AT 5PM FOR MANIC EPISODES    SILTUSSIN  MG/5ML SYRUP    TAKE 10ML BY MOUTH EVERY 4 HOURS AS NEEDED FOR COUGH    SUDOGEST 30 MG TABLET    TAKE 2 TABLETS BY MOUTH EVERY 4 HOURS AS NEEDED FOR CONGESTION    SUNSCREEN SPF50 LOTN    [id number] TOPICALLY 3 TIMES DAILY AS NEEDED FOR INSECT REPELLANT    THIORIDAZINE (MELLARIL) 25 MG TABLET    Take 25 mg by mouth 3 times daily    VITAMIN D (ERGOCALCIFEROL) 1.25 MG (61309 UT) CAPS CAPSULE    TAKE 1 CAPSULE BY MOUTH ONCE A WEEK ON WEDNESDAYS       ALLERGIES     Patient has no known allergies.     FAMILY HISTORY       Family History   Family history unknown: Yes          SOCIAL HISTORY       Social History     Socioeconomic History    Marital status: Single     Spouse name: None    Number of children: None    Years of education: None    Highest education level: None   Occupational History    None   Social Needs    Financial resource strain: None    Food insecurity:     Worry: None     Inability: None    Transportation needs:     Medical: None     Non-medical: None   Tobacco Use    Smoking status: Never Smoker    Smokeless tobacco: Never Used   Substance and Sexual Activity    Alcohol use: No    Drug use: No    Sexual activity: Never   Lifestyle    Physical activity:     Days per week: None     Minutes per session: None    Stress: None   Relationships    Social connections:     Talks on phone: None     Gets together: None     Attends Muslim service: None     Active member of club or organization: None     Attends meetings of clubs or organizations: None     Relationship status: None    Intimate partner violence:     Fear of current or ex partner: None     Emotionally abused: None     Physically abused: None     Forced sexual activity: None   Other Topics Concern    None   Social History Narrative    None       SCREENINGS    Rhiannon Coma Scale  Eye Opening: Spontaneous  Best Verbal Response: Oriented  Best Motor Response: Obeys commands  Rhiannon Coma Scale Score: 15          PHYSICAL EXAM    (up to 7 for level 4, 8 or more for level 5)     ED Triage Vitals [02/20/20 1132]   BP Temp Temp Source Pulse Resp SpO2 Height Weight   127/79 98 °F (36.7 °C) Oral 85 16 98 % 5' 1\" (1.549 m) 157 lb 2 oz (71.3 kg)       Physical Exam  Vitals signs and nursing note reviewed. Constitutional:       General: She is not in acute distress. Appearance: Normal appearance. She is not ill-appearing, toxic-appearing or diaphoretic. HENT:      Head: Normocephalic and atraumatic. Right Ear: Tympanic membrane normal.      Left Ear: Tympanic membrane normal.      Nose: Nose normal.      Mouth/Throat:      Mouth: Mucous membranes are moist.      Pharynx: No oropharyngeal exudate or posterior oropharyngeal erythema. Eyes:      General:         Right eye: No discharge. Left eye: No discharge. Extraocular Movements: Extraocular movements intact. Conjunctiva/sclera: Conjunctivae normal.      Pupils: Pupils are equal, round, and reactive to light.    Neck:      Musculoskeletal: No neck rigidity or muscular tenderness. Vascular: No carotid bruit. Cardiovascular:      Rate and Rhythm: Normal rate and regular rhythm. Pulses: Normal pulses. Abdominal:      General: Abdomen is flat. Bowel sounds are normal. There is no distension. Palpations: There is no mass. Tenderness: There is abdominal tenderness. There is no right CVA tenderness, left CVA tenderness, guarding or rebound. Negative signs include Borja's sign, Rovsing's sign, McBurney's sign, psoas sign and obturator sign. Hernia: No hernia is present. There is no hernia in the umbilical area or ventral area. Comments: Patient does have some mild suprapubic discomfort but I do not feel that there is any peritoneal irritation   Musculoskeletal: Normal range of motion. Lymphadenopathy:      Cervical: No cervical adenopathy. Skin:     General: Skin is warm. Neurological:      General: No focal deficit present. Mental Status: She is alert and oriented to person, place, and time. DIAGNOSTIC RESULTS     EKG: All EKG's are interpreted by the Emergency Department Physician who either signs or Co-signs this chart in the absence of a cardiologist.        RADIOLOGY:   Non-plain film images such as CT, Ultrasound and MRI are read by the radiologist. Plain radiographic images are visualized and preliminarily interpreted by the emergency physician with the below findings:        Interpretation per the Radiologist below, if available at the time of this note:    XR ABDOMEN (KUB) (SINGLE AP VIEW)   Final Result   No significant finding in the abdomen.                  LABS:  Results for orders placed or performed during the hospital encounter of 02/20/20   Urinalysis   Result Value Ref Range    Color, UA Yellow Straw/Yellow    Clarity, UA SL CLOUDY (A) Clear    Glucose, Ur Negative Negative mg/dL    Bilirubin Urine Negative Negative    Ketones, Urine TRACE (A) Negative mg/dL    Specific Gravity, UA 1.020 1.005 - 1.030    Blood, Urine

## 2020-02-24 ENCOUNTER — OFFICE VISIT (OUTPATIENT)
Dept: FAMILY MEDICINE CLINIC | Age: 55
End: 2020-02-24
Payer: MEDICARE

## 2020-02-24 VITALS
OXYGEN SATURATION: 99 % | RESPIRATION RATE: 16 BRPM | DIASTOLIC BLOOD PRESSURE: 76 MMHG | WEIGHT: 157.2 LBS | BODY MASS INDEX: 29.68 KG/M2 | HEART RATE: 87 BPM | SYSTOLIC BLOOD PRESSURE: 114 MMHG | TEMPERATURE: 98.2 F | HEIGHT: 61 IN

## 2020-02-24 PROCEDURE — G8417 CALC BMI ABV UP PARAM F/U: HCPCS | Performed by: NURSE PRACTITIONER

## 2020-02-24 PROCEDURE — 1036F TOBACCO NON-USER: CPT | Performed by: NURSE PRACTITIONER

## 2020-02-24 PROCEDURE — 3017F COLORECTAL CA SCREEN DOC REV: CPT | Performed by: NURSE PRACTITIONER

## 2020-02-24 PROCEDURE — G8484 FLU IMMUNIZE NO ADMIN: HCPCS | Performed by: NURSE PRACTITIONER

## 2020-02-24 PROCEDURE — 99213 OFFICE O/P EST LOW 20 MIN: CPT | Performed by: NURSE PRACTITIONER

## 2020-02-24 PROCEDURE — G8427 DOCREV CUR MEDS BY ELIG CLIN: HCPCS | Performed by: NURSE PRACTITIONER

## 2020-02-24 ASSESSMENT — ENCOUNTER SYMPTOMS
ROS SKIN COMMENTS: SEE HPI
GASTROINTESTINAL NEGATIVE: 1
RESPIRATORY NEGATIVE: 1

## 2020-02-24 NOTE — PROGRESS NOTES
Subjective:      Patient ID: Roberto Recio is a 62 y.o. female. HPI  Here today for follow up from recent ER visit for a UTI. Is doing much better, no abdominal pain or dysuria or fever. Still on Ceftin, taking it as directed    Caregiver is also concerned about scratching and dry skin. Uses dial SOAP to bathe daily. Has open areas on forarms, chest back and buttocks. Review of Systems   Constitutional: Negative. Respiratory: Negative. Cardiovascular: Negative. Gastrointestinal: Negative. Genitourinary: Negative. Musculoskeletal: Negative. Skin:        See HPI   Neurological: Negative. Psychiatric/Behavioral: Negative. Objective:   Physical Exam  Vitals signs and nursing note reviewed. Constitutional:       Appearance: Normal appearance. She is normal weight. Cardiovascular:      Rate and Rhythm: Normal rate and regular rhythm. Pulses: Normal pulses. Heart sounds: No murmur. Pulmonary:      Effort: No respiratory distress. Breath sounds: Normal breath sounds. No wheezing or rhonchi. Abdominal:      General: Abdomen is flat. Bowel sounds are normal. There is no distension. Palpations: Abdomen is soft. Tenderness: There is no abdominal tenderness. There is no right CVA tenderness, left CVA tenderness or rebound. Musculoskeletal: Normal range of motion. Skin:     Capillary Refill: Capillary refill takes less than 2 seconds. Comments: Skin overall dry and flaky, numerous healed  & open crusted over areas for forearms and chest. Scant amount of serous drainage from are on right upper forearm. Neurological:      General: No focal deficit present. Mental Status: She is alert. Mental status is at baseline. Psychiatric:         Mood and Affect: Mood normal.         Behavior: Behavior normal.         Assessment:      1. UTI  2. Dry skin      Plan:      1. Is resolving advised to finish meds and keep up water intake.   Call if any problems

## 2020-02-24 NOTE — PATIENT INSTRUCTIONS
Patient Education        Urinary Tract Infection in Women: Care Instructions  Your Care Instructions    A urinary tract infection, or UTI, is a general term for an infection anywhere between the kidneys and the urethra (where urine comes out). Most UTIs are bladder infections. They often cause pain or burning when you urinate. UTIs are caused by bacteria and can be cured with antibiotics. Be sure to complete your treatment so that the infection goes away. Follow-up care is a key part of your treatment and safety. Be sure to make and go to all appointments, and call your doctor if you are having problems. It's also a good idea to know your test results and keep a list of the medicines you take. How can you care for yourself at home? · Take your antibiotics as directed. Do not stop taking them just because you feel better. You need to take the full course of antibiotics. · Drink extra water and other fluids for the next day or two. This may help wash out the bacteria that are causing the infection. (If you have kidney, heart, or liver disease and have to limit fluids, talk with your doctor before you increase your fluid intake.)  · Avoid drinks that are carbonated or have caffeine. They can irritate the bladder. · Urinate often. Try to empty your bladder each time. · To relieve pain, take a hot bath or lay a heating pad set on low over your lower belly or genital area. Never go to sleep with a heating pad in place. To prevent UTIs  · Drink plenty of water each day. This helps you urinate often, which clears bacteria from your system. (If you have kidney, heart, or liver disease and have to limit fluids, talk with your doctor before you increase your fluid intake.)  · Urinate when you need to. · Urinate right after you have sex. · Change sanitary pads often. · Avoid douches, bubble baths, feminine hygiene sprays, and other feminine hygiene products that have deodorants.   · After going to the bathroom, wipe from front to back. When should you call for help? Call your doctor now or seek immediate medical care if:    · Symptoms such as fever, chills, nausea, or vomiting get worse or appear for the first time.     · You have new pain in your back just below your rib cage. This is called flank pain.     · There is new blood or pus in your urine.     · You have any problems with your antibiotic medicine.    Watch closely for changes in your health, and be sure to contact your doctor if:    · You are not getting better after taking an antibiotic for 2 days.     · Your symptoms go away but then come back. Where can you learn more? Go to https://Refurrlpepiceweb.[employer]. org and sign in to your [employer] account. Enter Q273 in the Brozengo box to learn more about \"Urinary Tract Infection in Women: Care Instructions. \"     If you do not have an account, please click on the \"Sign Up Now\" link. Current as of: August 21, 2019  Content Version: 12.3  © 3307-1429 Healthwise, [employer]. Care instructions adapted under license by [facility] [place] ([facility]). If you have questions about a medical condition or this instruction, always ask your healthcare professional. [place] [address] any warranty or liability for your use of this information. Patient Education        Dry Skin: Care Instructions  Your Care Instructions  Dry skin is a common problem, especially in areas where the air is very dry. Dry skin can also become a problem as you get older and lose natural oils that keep your skin moist.  A tendency toward dry, itchy skin may run in families. Some problems with the body's defenses (immune system), allergies, or an infection with a fungus may also cause patches of dry skin. An over-the-counter cream may help your dry skin. If your skin problem does not get better with home treatment, your doctor may prescribe ointment. You may need antibiotics if you have a skin infection.   Follow-up care is a key part of your treatment and safety. Be sure to make and go to all appointments, and call your doctor if you are having problems. It's also a good idea to know your test results and keep a list of the medicines you take. How can you care for yourself at home? Showers and baths  · Keep showers and baths short, and use warm or lukewarm water. Don't use hot water. It takes off more of your skin's natural oils. · Use as little soap as you can. Choose a mild soap, such as Dove, Cetaphil, or Neutrogena. Or use a skin cleanser like Aquanil or Cetaphil. · If you are taking a bath, use soap only at the very end. Then rinse off all traces of soap with fresh water. Gently pat your skin dry with a towel. Skin creams and moisturizers  · Apply moisturizer or skin cream right away (within 3 minutes) after a bath or shower. Use a moisturizer at other times too, as often as you need it. · Moisturizing creams are better than lotions. Try brands like CeraVe cream, Cetaphil cream, or Eucerin cream.  Other tips  · When washing clothes, use a small amount of detergent. Don't use fabric softeners or dryer sheets. · For small areas of itchy skin, try an over-the-counter 1% hydrocortisone cream.  · If you have very dry hands, spread petroleum jelly (such as Vaseline) on your hands before bed. Wear thin cotton gloves while you sleep. If your feet are dry, spread Vaseline on them and wear socks while you sleep. When should you call for help? Call your doctor now or seek immediate medical care if:    · You have signs of infection, such as:  ? Pain, warmth, or swelling in the skin. ? Red streaks near a wound in the skin. ? Pus coming from a wound in your skin. ? A fever.    Watch closely for changes in your health, and be sure to contact your doctor if:    · You do not get better as expected. Where can you learn more? Go to https://grace.komoot. org and sign in to your [employer] account.  Enter T934 in the Search Health Information box to learn more about \"Dry Skin: Care Instructions. \"     If you do not have an account, please click on the \"Sign Up Now\" link. Current as of: April 1, 2019  Content Version: 12.3  © 1692-8599 Healthwise, Incorporated. Care instructions adapted under license by [facility] ([facility]). If you have questions about a medical condition or this instruction, always ask your healthcare professional. [address] any warranty or liability for your use of this information.

## 2020-02-28 LAB
GRAM STAIN RESULT: NORMAL
WOUND/ABSCESS: NORMAL

## 2020-03-09 RX ORDER — ERGOCALCIFEROL 1.25 MG/1
CAPSULE ORAL
Qty: 5 CAPSULE | Refills: 2 | Status: SHIPPED | OUTPATIENT
Start: 2020-03-09 | End: 2020-06-16

## 2020-03-17 ENCOUNTER — TELEPHONE (OUTPATIENT)
Dept: FAMILY MEDICINE CLINIC | Age: 55
End: 2020-03-17

## 2020-03-17 NOTE — TELEPHONE ENCOUNTER
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 Claudell Drain did state the workshop is already closed, however the [facility] is requesting for each of the girls to get a letter from pcp for this. And faxed to fax # [phone]. Please Advise.  Thank You

## 2020-04-13 RX ORDER — DIVALPROEX SODIUM 250 MG/1
TABLET, DELAYED RELEASE ORAL
Qty: 60 TABLET | Refills: 2 | Status: SHIPPED | OUTPATIENT
Start: 2020-04-13 | End: 2020-07-22

## 2020-06-16 RX ORDER — SERTRALINE HYDROCHLORIDE 100 MG/1
TABLET, FILM COATED ORAL
Qty: 56 TABLET | Refills: 2 | Status: SHIPPED | OUTPATIENT
Start: 2020-06-16 | End: 2020-11-11

## 2020-06-16 RX ORDER — OLANZAPINE 10 MG/1
10 TABLET ORAL NIGHTLY
Qty: 56 TABLET | Refills: 2 | Status: SHIPPED | OUTPATIENT
Start: 2020-06-16 | End: 2020-11-11

## 2020-06-16 RX ORDER — ERGOCALCIFEROL 1.25 MG/1
CAPSULE ORAL
Qty: 5 CAPSULE | Refills: 2 | Status: SHIPPED | OUTPATIENT
Start: 2020-06-16 | End: 2020-09-15

## 2020-07-22 RX ORDER — DIVALPROEX SODIUM 250 MG/1
TABLET, DELAYED RELEASE ORAL
Qty: 60 TABLET | Refills: 1 | Status: SHIPPED | OUTPATIENT
Start: 2020-07-22 | End: 2020-09-15

## 2020-07-22 NOTE — TELEPHONE ENCOUNTER
[facility] called. Been trying to get a refill of the divalproex. Request is still pending.  Patient will be out of the medication tomorrow

## 2020-07-22 NOTE — TELEPHONE ENCOUNTER
Needs to have a depakote level drawn. Will refill this time, but needs to have it done before will refill again.     Thanks,  Tanja Cherry

## 2020-07-22 NOTE — TELEPHONE ENCOUNTER
Pharmacy notified and they will tell them at the home that she needs an OV since she has not had a fu in a long time and needs her depakote level checked.   I also left a msg Ms Angella Lopez # too

## 2020-07-27 ENCOUNTER — OFFICE VISIT (OUTPATIENT)
Dept: FAMILY MEDICINE CLINIC | Age: 55
End: 2020-07-27
Payer: MEDICARE

## 2020-07-27 VITALS
OXYGEN SATURATION: 97 % | BODY MASS INDEX: 28.36 KG/M2 | SYSTOLIC BLOOD PRESSURE: 108 MMHG | TEMPERATURE: 96.8 F | WEIGHT: 150 LBS | HEART RATE: 79 BPM | DIASTOLIC BLOOD PRESSURE: 68 MMHG

## 2020-07-27 PROCEDURE — 3017F COLORECTAL CA SCREEN DOC REV: CPT | Performed by: NURSE PRACTITIONER

## 2020-07-27 PROCEDURE — G8427 DOCREV CUR MEDS BY ELIG CLIN: HCPCS | Performed by: NURSE PRACTITIONER

## 2020-07-27 PROCEDURE — 1036F TOBACCO NON-USER: CPT | Performed by: NURSE PRACTITIONER

## 2020-07-27 PROCEDURE — 99213 OFFICE O/P EST LOW 20 MIN: CPT | Performed by: NURSE PRACTITIONER

## 2020-07-27 PROCEDURE — G8417 CALC BMI ABV UP PARAM F/U: HCPCS | Performed by: NURSE PRACTITIONER

## 2020-07-27 RX ORDER — CEPHALEXIN 250 MG/1
500 CAPSULE ORAL 3 TIMES DAILY
Qty: 60 CAPSULE | Refills: 0 | Status: SHIPPED | OUTPATIENT
Start: 2020-07-27 | End: 2020-07-27 | Stop reason: ALTCHOICE

## 2020-07-27 RX ORDER — CLINDAMYCIN HYDROCHLORIDE 300 MG/1
300 CAPSULE ORAL 3 TIMES DAILY
Qty: 30 CAPSULE | Refills: 0 | Status: SHIPPED | OUTPATIENT
Start: 2020-07-27 | End: 2020-08-06

## 2020-07-27 ASSESSMENT — PATIENT HEALTH QUESTIONNAIRE - PHQ9
SUM OF ALL RESPONSES TO PHQ QUESTIONS 1-9: 0
2. FEELING DOWN, DEPRESSED OR HOPELESS: 0
1. LITTLE INTEREST OR PLEASURE IN DOING THINGS: 0
SUM OF ALL RESPONSES TO PHQ9 QUESTIONS 1 & 2: 0
SUM OF ALL RESPONSES TO PHQ QUESTIONS 1-9: 0

## 2020-07-27 ASSESSMENT — ENCOUNTER SYMPTOMS
GASTROINTESTINAL NEGATIVE: 1
RESPIRATORY NEGATIVE: 1
ROS SKIN COMMENTS: SEE HPI

## 2020-07-27 NOTE — PROGRESS NOTES
Subjective:      Patient ID: Gretchen Hernandez is a 60 y.o. female. HPI  Chief Complaint   Patient presents with    Sore     she has a spot on her neck that she has been picking at and it will not heal up     Blood Work     needs her depakote level drawn     Has an open area on her chest that itches. The caregivers are suing medicated ointment and a Band-Aid, but Theo Dixon removes it and picks at the area. No fever, or drainage. Also her for labs. Behavior is well controlled on her Depakote. Review of Systems   Constitutional: Negative. HENT: Negative. Respiratory: Negative. Cardiovascular: Negative. Gastrointestinal: Negative. Genitourinary: Negative. Musculoskeletal: Negative. Skin:        See HPI   Neurological: Negative. Psychiatric/Behavioral: Negative. Objective:   Physical Exam  Vitals signs and nursing note reviewed. Constitutional:       General: She is not in acute distress. Appearance: Normal appearance. She is normal weight. She is not ill-appearing. HENT:      Head: Normocephalic and atraumatic. Right Ear: Tympanic membrane, ear canal and external ear normal. There is no impacted cerumen. Left Ear: Tympanic membrane, ear canal and external ear normal. There is no impacted cerumen. Nose: No congestion or rhinorrhea. Mouth/Throat:      Mouth: Mucous membranes are moist.      Pharynx: Oropharynx is clear. No oropharyngeal exudate or posterior oropharyngeal erythema. Eyes:      General: No scleral icterus. Right eye: No discharge. Left eye: No discharge. Extraocular Movements: Extraocular movements intact. Conjunctiva/sclera: Conjunctivae normal.      Pupils: Pupils are equal, round, and reactive to light. Neck:      Musculoskeletal: Normal range of motion and neck supple. Vascular: No carotid bruit. Cardiovascular:      Rate and Rhythm: Normal rate and regular rhythm. Pulses: Normal pulses.       Heart sounds: Normal heart sounds. Pulmonary:      Effort: Pulmonary effort is normal. No respiratory distress. Breath sounds: Normal breath sounds. Abdominal:      General: Abdomen is flat. Bowel sounds are normal.      Palpations: Abdomen is soft. Musculoskeletal: Normal range of motion. Lymphadenopathy:      Cervical: No cervical adenopathy. Skin:     General: Skin is warm and dry. Capillary Refill: Capillary refill takes less than 2 seconds. Coloration: Skin is not jaundiced. Findings: No rash. Comments: Quarter sized red  Raised soft area with some peeling skin, no drainage just below right clavicle, non tender. Neurological:      General: No focal deficit present. Mental Status: She is alert and oriented to person, place, and time. Mental status is at baseline. Psychiatric:         Mood and Affect: Mood normal.         Behavior: Behavior normal.         Thought Content: Thought content normal.         Judgment: Judgment normal.       /68   Pulse 79   Temp 96.8 °F (36 °C) (Infrared)   Wt 150 lb (68 kg)   LMP 04/12/2014   SpO2 97%   BMI 28.36 kg/m²     Assessment:      Superficial cellulitis      Plan:      Caregiver advised to keep area clean and can continue with dressing and ointment. Talked with Bradley Bundy about keeping the area covered. Will treat with Clindamycin 300 mgm TID.   If no better in 2 weeks RTC, pt and caregiver agrees with plan     Pat Corbin MA

## 2020-07-29 LAB
VALPROIC ACID % FREE: 11.3 % (ref 5–18.4)
VALPROIC ACID LEVEL: 70 UG/ML (ref 50–125)
VALPROIC ACID, FREE: 7.9 UG/ML (ref 7–23)
VALPROIC DATE LAST DOSE: NORMAL
VALPROIC DOSE AMOUNT: NORMAL
VALPROIC TIME LAST DOSE: NORMAL

## 2020-08-13 ENCOUNTER — TELEPHONE (OUTPATIENT)
Dept: FAMILY MEDICINE CLINIC | Age: 55
End: 2020-08-13

## 2020-08-13 NOTE — TELEPHONE ENCOUNTER
Patients caregiver called that she is picking the sore again, and is wanting to know if you need to see her again or if you can call what you called in last time for her? Please advise.

## 2020-08-17 RX ORDER — CEPHALEXIN 250 MG/1
500 CAPSULE ORAL 3 TIMES DAILY
Qty: 60 CAPSULE | Refills: 0 | Status: SHIPPED | OUTPATIENT
Start: 2020-08-17

## 2020-08-17 NOTE — TELEPHONE ENCOUNTER
Yes we can, but if she keeps bothering it, we need to send her to derm.     Thanks,  Roberta Christina

## 2020-09-16 RX ORDER — ERGOCALCIFEROL 1.25 MG/1
CAPSULE ORAL
Qty: 5 CAPSULE | Refills: 5 | Status: SHIPPED | OUTPATIENT
Start: 2020-09-16 | End: 2021-03-12

## 2020-09-16 RX ORDER — DIVALPROEX SODIUM 250 MG/1
TABLET, DELAYED RELEASE ORAL
Qty: 60 TABLET | Refills: 5 | Status: SHIPPED | OUTPATIENT
Start: 2020-09-16 | End: 2021-03-12

## 2020-09-29 ENCOUNTER — HOSPITAL ENCOUNTER (OUTPATIENT)
Dept: MAMMOGRAPHY | Age: 55
Discharge: HOME | End: 2020-09-29
Payer: MEDICARE

## 2020-09-29 PROCEDURE — 77067 SCR MAMMO BI INCL CAD: CPT

## 2020-10-02 ENCOUNTER — NURSE ONLY (OUTPATIENT)
Dept: FAMILY MEDICINE CLINIC | Age: 55
End: 2020-10-02
Payer: MEDICARE

## 2020-10-02 VITALS — TEMPERATURE: 98.4 F

## 2020-10-02 PROCEDURE — G0008 ADMIN INFLUENZA VIRUS VAC: HCPCS | Performed by: NURSE PRACTITIONER

## 2020-10-02 PROCEDURE — 36415 COLL VENOUS BLD VENIPUNCTURE: CPT | Performed by: NURSE PRACTITIONER

## 2020-10-02 PROCEDURE — 90688 IIV4 VACCINE SPLT 0.5 ML IM: CPT | Performed by: NURSE PRACTITIONER

## 2020-10-02 NOTE — PROGRESS NOTES
Vaccine Information Sheet, \"Influenza - Inactivated\"  given to Kenton Canas, or parent/legal guardian of  Kenton Canas and verbalized understanding. Patient responses:    Have you ever had a reaction to a flu vaccine? No  Do you have any current illness? No  Have you ever had Guillian Piasa Syndrome? No  Do you have a serious allergy to any of the follow: Neomycin, Polymyxin, Thimerosal, eggs or egg products? No    Flu vaccine given per order. Please see immunization tab. Risks and benefits explained. Current VIS given. Blood drawn per order. Needle size: 23  g butterfly  Site: R Antecubital.  First attempt successful Yes    Second attempt na    Pressure applied until bleeding stopped. Cotton ball and band aid  applied. Patient informed to call office or return if bleeding reoccurs and unable to stop.     Tubes drawn: 1 purple     3 red

## 2020-10-03 LAB
A/G RATIO: 1.6 (ref 1.1–2.2)
ALBUMIN SERPL-MCNC: 4.2 G/DL (ref 3.4–5)
ALP BLD-CCNC: 87 U/L (ref 40–129)
ALT SERPL-CCNC: 10 U/L (ref 10–40)
ANION GAP SERPL CALCULATED.3IONS-SCNC: 9 MMOL/L (ref 3–16)
AST SERPL-CCNC: 10 U/L (ref 15–37)
BILIRUB SERPL-MCNC: <0.2 MG/DL (ref 0–1)
BUN BLDV-MCNC: 11 MG/DL (ref 7–20)
CALCIUM SERPL-MCNC: 10 MG/DL (ref 8.3–10.6)
CHLORIDE BLD-SCNC: 105 MMOL/L (ref 99–110)
CHOLESTEROL, FASTING: 187 MG/DL (ref 0–199)
CO2: 26 MMOL/L (ref 21–32)
CREAT SERPL-MCNC: <0.5 MG/DL (ref 0.6–1.1)
ESTIMATED AVERAGE GLUCOSE: 111.2 MG/DL
FOLATE: 13.08 NG/ML (ref 4.78–24.2)
GFR AFRICAN AMERICAN: >60
GFR NON-AFRICAN AMERICAN: >60
GLOBULIN: 2.6 G/DL
GLUCOSE BLD-MCNC: 99 MG/DL (ref 70–99)
HBA1C MFR BLD: 5.5 %
HCT VFR BLD CALC: 37.9 % (ref 36–48)
HDLC SERPL-MCNC: 62 MG/DL (ref 40–60)
HEMOGLOBIN: 12.8 G/DL (ref 12–16)
LDL CHOLESTEROL CALCULATED: 99 MG/DL
MCH RBC QN AUTO: 29.7 PG (ref 26–34)
MCHC RBC AUTO-ENTMCNC: 33.7 G/DL (ref 31–36)
MCV RBC AUTO: 88.1 FL (ref 80–100)
PDW BLD-RTO: 13.2 % (ref 12.4–15.4)
PLATELET # BLD: 252 K/UL (ref 135–450)
PMV BLD AUTO: 8.9 FL (ref 5–10.5)
POTASSIUM SERPL-SCNC: 4.4 MMOL/L (ref 3.5–5.1)
RBC # BLD: 4.31 M/UL (ref 4–5.2)
SODIUM BLD-SCNC: 140 MMOL/L (ref 136–145)
TOTAL PROTEIN: 6.8 G/DL (ref 6.4–8.2)
TRIGLYCERIDE, FASTING: 129 MG/DL (ref 0–150)
TSH SERPL DL<=0.05 MIU/L-ACNC: 0.85 UIU/ML (ref 0.27–4.2)
VITAMIN B-12: 749 PG/ML (ref 211–911)
VITAMIN D 25-HYDROXY: 64.8 NG/ML
VLDLC SERPL CALC-MCNC: 26 MG/DL
WBC # BLD: 4.6 K/UL (ref 4–11)

## 2020-11-11 RX ORDER — SERTRALINE HYDROCHLORIDE 100 MG/1
TABLET, FILM COATED ORAL
Qty: 56 TABLET | Refills: 1 | Status: SHIPPED | OUTPATIENT
Start: 2020-11-11 | End: 2021-03-11

## 2020-11-11 RX ORDER — OLANZAPINE 10 MG/1
TABLET ORAL
Qty: 56 TABLET | Refills: 1 | Status: SHIPPED | OUTPATIENT
Start: 2020-11-11 | End: 2021-03-11

## 2021-01-22 ENCOUNTER — TELEPHONE (OUTPATIENT)
Dept: FAMILY MEDICINE CLINIC | Age: 56
End: 2021-01-22

## 2021-01-22 NOTE — TELEPHONE ENCOUNTER
[facility] with the patients home calling to request written permission from patients PCP to receive the COVID vaccination on 2/2/21, please advise.       Can be faxed to [phone]

## 2021-01-22 NOTE — LETTER
[facility] [address] [facility]  [id number] [facility] [address]  Phone: [phone]  Fax: [phone]    TAHIRA Voss CNP        2021      To Whom it May Concern; Maribel Lutz : 61 would benefit and should receive the Covid vaccine when it is available. Sincerely,          Abraham Keane, [address] [facility] [address]), [address] [place] [address]  [employer]

## 2021-01-25 RX ORDER — KETOCONAZOLE 20 MG/ML
SHAMPOO TOPICAL
Qty: 1 BOTTLE | Refills: 5 | Status: SHIPPED | OUTPATIENT
Start: 2021-01-25 | End: 2021-08-05

## 2021-03-12 RX ORDER — DIVALPROEX SODIUM 250 MG/1
TABLET, DELAYED RELEASE ORAL
Qty: 60 TABLET | Refills: 4 | Status: SHIPPED | OUTPATIENT
Start: 2021-03-12 | End: 2021-08-05

## 2021-03-12 RX ORDER — ERGOCALCIFEROL 1.25 MG/1
CAPSULE ORAL
Qty: 5 CAPSULE | Refills: 4 | Status: SHIPPED | OUTPATIENT
Start: 2021-03-12 | End: 2021-08-05

## 2021-03-12 RX ORDER — SERTRALINE HYDROCHLORIDE 100 MG/1
TABLET, FILM COATED ORAL
Qty: 56 TABLET | Refills: 2 | Status: SHIPPED | OUTPATIENT
Start: 2021-03-12 | End: 2021-08-05

## 2021-03-12 RX ORDER — OLANZAPINE 10 MG/1
TABLET ORAL
Qty: 56 TABLET | Refills: 2 | Status: SHIPPED | OUTPATIENT
Start: 2021-03-12 | End: 2021-08-05

## 2021-08-05 RX ORDER — SERTRALINE HYDROCHLORIDE 100 MG/1
TABLET, FILM COATED ORAL
Qty: 56 TABLET | Refills: 1 | Status: SHIPPED | OUTPATIENT
Start: 2021-08-05 | End: 2021-11-11

## 2021-08-05 RX ORDER — DIVALPROEX SODIUM 250 MG/1
TABLET, DELAYED RELEASE ORAL
Qty: 60 TABLET | Refills: 3 | Status: SHIPPED | OUTPATIENT
Start: 2021-08-05 | End: 2021-12-06

## 2021-08-05 RX ORDER — ERGOCALCIFEROL 1.25 MG/1
CAPSULE ORAL
Qty: 5 CAPSULE | Refills: 3 | Status: SHIPPED | OUTPATIENT
Start: 2021-08-05 | End: 2021-12-06

## 2021-08-05 RX ORDER — OLANZAPINE 10 MG/1
TABLET ORAL
Qty: 56 TABLET | Refills: 1 | Status: SHIPPED | OUTPATIENT
Start: 2021-08-05 | End: 2021-11-11

## 2021-08-05 RX ORDER — KETOCONAZOLE 20 MG/ML
SHAMPOO TOPICAL
Qty: 120 ML | Refills: 4 | Status: SHIPPED | OUTPATIENT
Start: 2021-08-05 | End: 2021-12-29

## 2021-11-03 NOTE — TELEPHONE ENCOUNTER
Alvin Gutiérrez is requesting refill(s)   Last OV 7/27/20 (pertaining to medication)  LR 8/5/21 (per medication requested)  Next office visit scheduled or attempted No   If no, reason:

## 2021-11-10 NOTE — TELEPHONE ENCOUNTER
Janae with [address] is asking for the status of prescriptions being filled.  Phone # is [phone]

## 2021-11-11 RX ORDER — OLANZAPINE 10 MG/1
TABLET ORAL
Qty: 56 TABLET | Refills: 0 | Status: SHIPPED | OUTPATIENT
Start: 2021-11-11 | End: 2021-12-29

## 2021-11-11 RX ORDER — SERTRALINE HYDROCHLORIDE 100 MG/1
TABLET, FILM COATED ORAL
Qty: 56 TABLET | Refills: 0 | Status: SHIPPED | OUTPATIENT
Start: 2021-11-11 | End: 2021-12-29

## 2022-01-14 ENCOUNTER — TELEPHONE (OUTPATIENT)
Dept: MAMMOGRAPHY | Age: 57
End: 2022-01-14

## 2022-01-14 ENCOUNTER — HOSPITAL ENCOUNTER (OUTPATIENT)
Dept: MAMMOGRAPHY | Age: 57
Discharge: HOME | End: 2022-01-14
Payer: MEDICARE

## 2022-01-14 DIAGNOSIS — Z12.31 ENCOUNTER FOR SCREENING MAMMOGRAM FOR BREAST CANCER: ICD-10-CM

## 2022-01-14 PROCEDURE — 77067 SCR MAMMO BI INCL CAD: CPT

## 2022-01-14 NOTE — TELEPHONE ENCOUNTER
Spoke with patient caretaker regarding screening mammogram results. Results negative. Radiologist recommends follow up in one year for a screening mammogram. Patient verbalized understanding.

## 2022-01-31 ENCOUNTER — OFFICE VISIT (OUTPATIENT)
Dept: FAMILY MEDICINE CLINIC | Age: 57
End: 2022-01-31
Payer: MEDICARE

## 2022-01-31 VITALS
DIASTOLIC BLOOD PRESSURE: 60 MMHG | WEIGHT: 148 LBS | BODY MASS INDEX: 27.98 KG/M2 | TEMPERATURE: 97 F | HEART RATE: 82 BPM | OXYGEN SATURATION: 98 % | SYSTOLIC BLOOD PRESSURE: 122 MMHG

## 2022-01-31 DIAGNOSIS — Z23 NEED FOR INFLUENZA VACCINATION: Primary | ICD-10-CM

## 2022-01-31 DIAGNOSIS — E78.5 HYPERLIPIDEMIA, UNSPECIFIED HYPERLIPIDEMIA TYPE: ICD-10-CM

## 2022-01-31 DIAGNOSIS — Z23 NEED FOR ZOSTER VACCINATION: ICD-10-CM

## 2022-01-31 DIAGNOSIS — E55.9 VITAMIN D DEFICIENCY: ICD-10-CM

## 2022-01-31 PROCEDURE — 90688 IIV4 VACCINE SPLT 0.5 ML IM: CPT | Performed by: NURSE PRACTITIONER

## 2022-01-31 PROCEDURE — 99214 OFFICE O/P EST MOD 30 MIN: CPT | Performed by: NURSE PRACTITIONER

## 2022-01-31 PROCEDURE — G8419 CALC BMI OUT NRM PARAM NOF/U: HCPCS | Performed by: NURSE PRACTITIONER

## 2022-01-31 PROCEDURE — G0008 ADMIN INFLUENZA VIRUS VAC: HCPCS | Performed by: NURSE PRACTITIONER

## 2022-01-31 PROCEDURE — G8482 FLU IMMUNIZE ORDER/ADMIN: HCPCS | Performed by: NURSE PRACTITIONER

## 2022-01-31 PROCEDURE — 3017F COLORECTAL CA SCREEN DOC REV: CPT | Performed by: NURSE PRACTITIONER

## 2022-01-31 PROCEDURE — G8427 DOCREV CUR MEDS BY ELIG CLIN: HCPCS | Performed by: NURSE PRACTITIONER

## 2022-01-31 PROCEDURE — 1036F TOBACCO NON-USER: CPT | Performed by: NURSE PRACTITIONER

## 2022-01-31 RX ORDER — ZOSTER VACCINE RECOMBINANT, ADJUVANTED 50 MCG/0.5
0.5 KIT INTRAMUSCULAR SEE ADMIN INSTRUCTIONS
Qty: 0.5 ML | Refills: 0 | Status: SHIPPED | OUTPATIENT
Start: 2022-01-31 | End: 2022-07-30

## 2022-01-31 ASSESSMENT — LIFESTYLE VARIABLES: HOW OFTEN DO YOU HAVE A DRINK CONTAINING ALCOHOL: 0

## 2022-01-31 NOTE — PROGRESS NOTES
Baldomero Cordoba (:  1965) is a 60 y.o. female,Established patient, here for evaluation of the following chief complaint(s):  Check-Up and Mental Health Problem         ASSESSMENT/PLAN:  1. Need for influenza vaccination  -     INFLUENZA, QUADV, 3 YRS AND OLDER, IM, MDV, 0.5ML ()  2. Need for zoster vaccination  -     zoster recombinant adjuvanted vaccine [facility]) 50 MCG/0.5ML SUSR injection; Inject 0.5 mLs into the muscle See Admin Instructions 1 dose now and repeat in 2-6 months, Disp-0.5 mL, R-0Print    Will give the above vaccines per the request of the home. Given order for the shingles vaccine. The home will schedule  This. 3. Schizophrenia  4. Neurodevelopmental disorder- these are both stable will continue with her current meds. 5. Hyperlipidemia-  Will continue with her meds, is due for labs. Will schedule an appointments for toses and treat as indicated. Otherwise will RTC in 6 months or sooner as needed Her caregivers agree with plan           Subjective   SUBJECTIVE/OBJECTIVE:  HPI  Here for a check up. Group home care giver with her today relates that [facility] has been doing well, has not had any behaviors and generally gets along well with the other residents and staff. Appetite is good. Sleeps 6-8 hours/night. Review of Systems   Constitutional: Negative. HENT: Negative. Eyes: Negative. Respiratory: Negative. Cardiovascular: Negative. Gastrointestinal: Negative. Endocrine: Negative. Genitourinary: Negative. Musculoskeletal: Negative. Skin: Negative. Neurological: Negative. Psychiatric/Behavioral: Negative. Objective   Physical Exam  Vitals and nursing note reviewed. Constitutional:       General: She is not in acute distress. Appearance: Normal appearance. She is normal weight. She is not ill-appearing or toxic-appearing. HENT:      Head: Normocephalic and atraumatic.       Right Ear: Hearing, tympanic membrane, ear canal and external ear normal.      Left Ear: Hearing, tympanic membrane, ear canal and external ear normal.      Nose: Nose normal. No congestion or rhinorrhea. Mouth/Throat:      Mouth: Mucous membranes are moist.      Pharynx: Oropharynx is clear. No oropharyngeal exudate or posterior oropharyngeal erythema. Eyes:      General: No scleral icterus. Right eye: No discharge. Left eye: No discharge. Extraocular Movements: Extraocular movements intact. Conjunctiva/sclera: Conjunctivae normal.      Pupils: Pupils are equal, round, and reactive to light. Funduscopic exam:     Right eye: No hemorrhage, exudate, AV nicking, arteriolar narrowing or papilledema. Left eye: No hemorrhage, exudate, AV nicking, arteriolar narrowing or papilledema. Neck:      Thyroid: No thyromegaly or thyroid tenderness. Vascular: No carotid bruit. Cardiovascular:      Rate and Rhythm: Normal rate and regular rhythm. Pulses: Normal pulses. Heart sounds: Normal heart sounds. No murmur heard. Pulmonary:      Effort: Pulmonary effort is normal. No respiratory distress. Breath sounds: Normal breath sounds. No wheezing or rhonchi. Abdominal:      General: Abdomen is flat. Bowel sounds are normal. There is no distension. Palpations: Abdomen is soft. There is no mass. Tenderness: There is no abdominal tenderness. Musculoskeletal:         General: Normal range of motion. Cervical back: Normal range of motion and neck supple. No tenderness. Lymphadenopathy:      Cervical: No cervical adenopathy. Skin:     General: Skin is warm and dry. Capillary Refill: Capillary refill takes less than 2 seconds. Coloration: Skin is not jaundiced or pale. Findings: No rash. Neurological:      General: No focal deficit present. Mental Status: She is alert and oriented to person, place, and time. Mental status is at baseline.    Psychiatric:         Mood and Affect: Mood normal.         Behavior: Behavior normal.         Thought Content: Thought content normal.                  An electronic signature was used to authenticate this note.     --TAHIRA Chacon - CNP

## 2022-02-03 ASSESSMENT — ENCOUNTER SYMPTOMS
GASTROINTESTINAL NEGATIVE: 1
RESPIRATORY NEGATIVE: 1
EYES NEGATIVE: 1

## 2022-02-03 NOTE — PATIENT INSTRUCTIONS
Patient Education        Varicella (Chickenpox) Vaccine: What You Need to Know  Why get vaccinated? Varicella vaccine can prevent varicella. Varicella, also called \"chickenpox,\" causes an itchy rash that usually lasts about a week. It can also cause fever, tiredness, loss of appetite, and headache. It can lead to skin infections, pneumonia, inflammation of the blood vessels, swelling of the brain and/or spinal cord covering, and infections of the bloodstream, bone, or joints. Some people who get chickenpox get a painful rash called \"shingles\" (also known as herpes zoster) years later. Chickenpox is usually mild, but it can be serious in infants under 15months of age, adolescents, adults, pregnant people, and people with a weakened immune system. Some people get so sick that they need to be hospitalized. It doesn't happen often, but people can die from chickenpox. Most people who are vaccinated with 2 doses of varicella vaccine will be protected for life. Varicella vaccine  Children need 2 doses of varicella vaccine, usually:  · First dose: age 15 through 17 months  · Second dose: age 3 through 6 years  Older children, adolescents, and adults also need 2 doses of varicella vaccine if they are not already immune to chickenpox. Varicella vaccine may be given at the same time as other vaccines. Also, a child between 13 months and 15years of age might receive varicella vaccine together with MMR (measles, mumps, and rubella) vaccine in a single shot, known as MMRV. Your health care provider can give you more information.   Talk with your health care provider  Tell your vaccination provider if the person getting the vaccine:  · Has had an allergic reaction after a previous dose of varicella vaccine, or has any severe, life-threatening allergies  · Is pregnantor thinks they might be pregnant  pregnant people should not get varicella vaccine  · Has a weakened immune system, or has a parent, brother, or sister with a history of hereditary or congenital immune system problems  · Is taking salicylates (such as aspirin)  · Has recently had a blood transfusion or received other blood products  · Has tuberculosis  · Has gotten any other vaccines in the past 4 weeks  In some cases, your health care provider may decide to postpone varicella vaccination until a future visit. People with minor illnesses, such as a cold, may be vaccinated. People who are moderately or severely ill should usually wait until they recover before getting varicella vaccine. Your health care provider can give you more information. Risks of a vaccine reaction  · Sore arm from the injection, redness or rash where the shot is given, or fever can happen after varicella vaccination. · More serious reactions happen very rarely. These can include pneumonia, infection of the brain and/or spinal cord covering, or seizures that are often associated with fever. · In people with serious immune system problems, this vaccine may cause an infection that may be life-threatening. People with serious immune system problems should not get varicella vaccine. It is possible for a vaccinated person to develop a rash. If this happens, the varicella vaccine virus could be spread to an unprotected person. Anyone who gets a rash should stay away from infants and people with a weakened immune system until the rash goes away. Talk with your health care provider to learn more. Some people who are vaccinated against chickenpox get shingles (herpes zoster) years later. This is much less common after vaccination than after chickenpox disease. People sometimes faint after medical procedures, including vaccination. Tell your provider if you feel dizzy or have vision changes or ringing in the ears. As with any medicine, there is a very remote chance of a vaccine causing a severe allergic reaction, other serious injury, or death. What if there is a serious problem?   An allergic reaction could occur after the vaccinated person leaves the clinic. If you see signs of a severe allergic reaction (hives, swelling of the face and throat, difficulty breathing, a fast heartbeat, dizziness, or weakness), call 9-1-1 and get the person to the nearest hospital.  For other signs that concern you, call your health care provider. Adverse reactions should be reported to the Vaccine Adverse Event Reporting System (VAERS). Your health care provider will usually file this report, or you can do it yourself. Visit the VAERS website at www.vaers. [facility].gov or call 4-958.726.5029. VAERS is only for reporting reactions, and VAERS staff members do not give medical advice. The National Vaccine Injury Compensation Program  The National Vaccine Injury Compensation Program (VICP) is a federal program that was created to compensate people who may have been injured by certain vaccines. Claims regarding alleged injury or death due to vaccination have a time limit for filing, which may be as short as two years. Visit the VICP website at www.[facility].gov/vaccinecompensation or call 6-719.326.1994 to learn about the program and about filing a claim. How can I learn more? · Ask your health care provider. · Call your local or state health department. · Visit the website of the Food and Drug Administration (FDA) for vaccine package inserts and additional information at www.fda.gov/vaccines-blood-biologics/vaccines. · Contact the Centers for Disease Control and Prevention (CDC):  ? Call 7-543.167.2308 (1-800-CDC-INFO) or  ? Visit CDC's www.cdc.gov/vaccines. Vaccine Information Statement  Varicella Vaccine  8/6/2021  42 OSVAL[address] Ramirez [id number]  [facility] and [employer] for Disease Control and Prevention  Many vaccine information statements are available in Portuguese and other languages. See www.immunize.org/vis  Hojas de información sobre vacunas están disponibles en español y en muchos otros idiomas.  Visite www.immunize.org/vis  Care instructions adapted under license by [facility] ([facility]). If you have questions about a medical condition or this instruction, always ask your healthcare professional. [address] any warranty or liability for your use of this information. Patient Education        High Cholesterol: Care Instructions  Overview     Cholesterol is a type of fat in your blood. It is needed for many body functions, such as making new cells. Cholesterol is made by your body. It also comes from food you eat. High cholesterol means that you have too much of the fat in your blood. This raises your risk of a heart attack and stroke. LDL and HDL are part of your total cholesterol. LDL is the \"bad\" cholesterol. High LDL can raise your risk for coronary artery disease, heart attack, and stroke. HDL is the \"good\" cholesterol. It helps clear bad cholesterol from the body. High HDL is linked with a lower risk of coronary artery disease, heart attack, and stroke. Your cholesterol levels help your doctor find out your risk for having a heart attack or stroke. You and your doctor can talk about whether you need to lower your risk and what treatment is best for you. Treatment options include a heart-healthy lifestyle and medicine. Both options can help lower your cholesterol and your risk. The way you choose to lower your risk will depend on how high your risk is for heart attack and stroke. It will also depend on how you feel about taking medicines. Follow-up care is a key part of your treatment and safety. Be sure to make and go to all appointments, and call your doctor if you are having problems. It's also a good idea to know your test results and keep a list of the medicines you take. How can you care for yourself at home? · Eat heart-healthy foods. ? Eat fruits, vegetables, whole grains, beans, and other high-fiber foods.   ? Eat lean proteins, such as seafood, lean meats, beans, nuts, and soy products. ? Eat healthy fats, such as canola and olive oil. ? Choose foods that are low in saturated fat. ? Limit sodium and alcohol. ? Limit drinks and foods with added sugar. · Be physically active. Try to do moderate activity at least 2½ hours a week. Or try to do vigorous activity at least 1¼ hours a week. You may want to walk or try other activities, such as running, swimming, cycling, or playing tennis or team sports. · Stay at a healthy weight or lose weight by making the changes in eating and physical activity listed above. Losing just a small amount of weight, even 5 to 10 pounds, can help reduce your risk for having a heart attack or stroke. · Do not smoke. · Manage other health problems. These include diabetes and high blood pressure. If you think you may have a problem with alcohol or drug use, talk to your doctor. · If you take medicine, take it exactly as prescribed. Call your doctor if you think you are having a problem with your medicine. · Check with your doctor or pharmacist before you use any other medicines, including over-the-counter medicines. Make sure your doctor knows all of the medicines, vitamins, herbal products, and supplements you take. Taking some medicines together can cause problems. When should you call for help? Watch closely for changes in your health, and be sure to contact your doctor if:    · You need help making lifestyle changes.     · You have questions about your medicine. Where can you learn more? Go to https://chsisi.Breathometer. org and sign in to your [employer] Blog[employer].it account. Enter V391 in the [facility] box to learn more about \"High Cholesterol: Care Instructions. \"     If you do not have an account, please click on the \"Sign Up Now\" link. Current as of: April 29, 2021               Content Version: 13.1  © 0462-7824 Healthwise, Footnote. Care instructions adapted under license by [facility] ([facility]).  If you have questions about a

## 2022-02-11 ENCOUNTER — TELEPHONE (OUTPATIENT)
Dept: FAMILY MEDICINE CLINIC | Age: 57
End: 2022-02-11

## 2022-02-11 DIAGNOSIS — F41.9 ANXIETY: Primary | ICD-10-CM

## 2022-02-11 RX ORDER — DIAZEPAM 5 MG/1
TABLET ORAL
Qty: 5 TABLET | Refills: 0 | Status: SHIPPED | OUTPATIENT
Start: 2022-02-11 | End: 2022-02-11

## 2022-02-11 NOTE — TELEPHONE ENCOUNTER
Patient is scheduled to have an obgyn exam on 2-22-22. Her last exam she had to be held down for 4 hours by 4 people.  Asking if Eward Mortimer CNP would prescribe something that she can take an hour before exam to keep her calm during exam. [facility]

## 2022-02-17 ENCOUNTER — TELEPHONE (OUTPATIENT)
Dept: FAMILY MEDICINE CLINIC | Age: 57
End: 2022-02-17

## 2022-02-17 DIAGNOSIS — F41.9 ANXIETY: ICD-10-CM

## 2022-02-17 RX ORDER — DIAZEPAM 5 MG/1
TABLET ORAL
Qty: 5 TABLET | Refills: 0 | Status: CANCELLED | OUTPATIENT
Start: 2022-02-17 | End: 2022-02-17

## 2022-02-17 NOTE — TELEPHONE ENCOUNTER
On 2/11/22 Karel called in requesting something be sent in for anxiety to help patient through OBGYN exam. Looks like Valium was sent to [address] [place] [address], they had requested it be sent to [facility]. Please advise.

## 2022-02-20 DIAGNOSIS — F41.9 ANXIETY: Primary | ICD-10-CM

## 2022-02-20 RX ORDER — DIAZEPAM 5 MG/1
5 TABLET ORAL EVERY 8 HOURS PRN
Qty: 3 TABLET | Refills: 0 | Status: SHIPPED | OUTPATIENT
Start: 2022-02-20 | End: 2022-02-22

## 2022-03-18 ENCOUNTER — NURSE ONLY (OUTPATIENT)
Dept: FAMILY MEDICINE CLINIC | Age: 57
End: 2022-03-18
Payer: MEDICARE

## 2022-03-18 DIAGNOSIS — E78.5 HYPERLIPIDEMIA, UNSPECIFIED HYPERLIPIDEMIA TYPE: ICD-10-CM

## 2022-03-18 DIAGNOSIS — E55.9 VITAMIN D DEFICIENCY: ICD-10-CM

## 2022-03-18 LAB
A/G RATIO: 1.5 (ref 1.1–2.2)
ALBUMIN SERPL-MCNC: 4.3 G/DL (ref 3.4–5)
ALP BLD-CCNC: 89 U/L (ref 40–129)
ALT SERPL-CCNC: 10 U/L (ref 10–40)
ANION GAP SERPL CALCULATED.3IONS-SCNC: 13 MMOL/L (ref 3–16)
AST SERPL-CCNC: 10 U/L (ref 15–37)
BASOPHILS ABSOLUTE: 0 K/UL (ref 0–0.2)
BASOPHILS RELATIVE PERCENT: 0.4 %
BILIRUB SERPL-MCNC: <0.2 MG/DL (ref 0–1)
BUN BLDV-MCNC: 10 MG/DL (ref 7–20)
CALCIUM SERPL-MCNC: 9.8 MG/DL (ref 8.3–10.6)
CHLORIDE BLD-SCNC: 102 MMOL/L (ref 99–110)
CHOLESTEROL, TOTAL: 200 MG/DL (ref 0–199)
CO2: 26 MMOL/L (ref 21–32)
CREAT SERPL-MCNC: 0.6 MG/DL (ref 0.6–1.1)
EOSINOPHILS ABSOLUTE: 0 K/UL (ref 0–0.6)
EOSINOPHILS RELATIVE PERCENT: 0.7 %
FOLATE: 12.2 NG/ML (ref 4.78–24.2)
GFR AFRICAN AMERICAN: >60
GFR NON-AFRICAN AMERICAN: >60
GLUCOSE BLD-MCNC: 86 MG/DL (ref 70–99)
HCT VFR BLD CALC: 40.4 % (ref 36–48)
HDLC SERPL-MCNC: 74 MG/DL (ref 40–60)
HEMOGLOBIN: 13.3 G/DL (ref 12–16)
LDL CHOLESTEROL CALCULATED: 109 MG/DL
LYMPHOCYTES ABSOLUTE: 2.1 K/UL (ref 1–5.1)
LYMPHOCYTES RELATIVE PERCENT: 53.9 %
MCH RBC QN AUTO: 29.1 PG (ref 26–34)
MCHC RBC AUTO-ENTMCNC: 32.9 G/DL (ref 31–36)
MCV RBC AUTO: 88.4 FL (ref 80–100)
MONOCYTES ABSOLUTE: 0.2 K/UL (ref 0–1.3)
MONOCYTES RELATIVE PERCENT: 6.6 %
NEUTROPHILS ABSOLUTE: 1.5 K/UL (ref 1.7–7.7)
NEUTROPHILS RELATIVE PERCENT: 38.4 %
PDW BLD-RTO: 13.8 % (ref 12.4–15.4)
PLATELET # BLD: 228 K/UL (ref 135–450)
PMV BLD AUTO: 8.9 FL (ref 5–10.5)
POTASSIUM SERPL-SCNC: 4.2 MMOL/L (ref 3.5–5.1)
RBC # BLD: 4.57 M/UL (ref 4–5.2)
SODIUM BLD-SCNC: 141 MMOL/L (ref 136–145)
TOTAL PROTEIN: 7.1 G/DL (ref 6.4–8.2)
TRIGL SERPL-MCNC: 84 MG/DL (ref 0–150)
TSH REFLEX: 1.4 UIU/ML (ref 0.27–4.2)
VITAMIN B-12: 717 PG/ML (ref 211–911)
VITAMIN D 25-HYDROXY: 70.2 NG/ML
VLDLC SERPL CALC-MCNC: 17 MG/DL
WBC # BLD: 3.8 K/UL (ref 4–11)

## 2022-03-18 PROCEDURE — 36415 COLL VENOUS BLD VENIPUNCTURE: CPT | Performed by: NURSE PRACTITIONER

## 2022-03-21 DIAGNOSIS — E78.5 HYPERLIPIDEMIA, UNSPECIFIED HYPERLIPIDEMIA TYPE: Primary | ICD-10-CM

## 2022-03-25 ENCOUNTER — TELEPHONE (OUTPATIENT)
Dept: FAMILY MEDICINE CLINIC | Age: 57
End: 2022-03-25

## 2022-03-25 NOTE — TELEPHONE ENCOUNTER
[address] [place] [address] called.  Scheduled an appointment to see Jadiel Saenz CNP on 3/28/22

## 2022-03-25 NOTE — TELEPHONE ENCOUNTER
Spoke with ese she is going to call us back to schedule appointment.  To make sure she has the staff to take either today at 140 or Monday with loretta

## 2022-03-25 NOTE — TELEPHONE ENCOUNTER
[address] [facility] noticed this morning that patient has a knot and area bruised from corner of eye down to cheek. patient told them that she fell yesterday at work shop, [employer] in [employer]. Extremely bruised. Asking if she needs to make an appointment to have her seen or to just keep an eye on it. Please advise.

## 2022-03-30 RX ORDER — DIVALPROEX SODIUM 250 MG/1
TABLET, DELAYED RELEASE ORAL
Qty: 60 TABLET | Refills: 2 | Status: SHIPPED | OUTPATIENT
Start: 2022-03-30 | End: 2022-07-08

## 2022-03-30 RX ORDER — ERGOCALCIFEROL 1.25 MG/1
CAPSULE ORAL
Qty: 5 CAPSULE | Refills: 2 | Status: SHIPPED | OUTPATIENT
Start: 2022-03-30 | End: 2022-07-08

## 2022-04-28 RX ORDER — KETOCONAZOLE 20 MG/ML
SHAMPOO TOPICAL
Qty: 120 ML | Refills: 2 | Status: SHIPPED | OUTPATIENT
Start: 2022-04-28 | End: 2022-08-01

## 2022-07-08 RX ORDER — DIVALPROEX SODIUM 250 MG/1
TABLET, DELAYED RELEASE ORAL
Qty: 60 TABLET | Refills: 1 | Status: SHIPPED | OUTPATIENT
Start: 2022-07-08 | End: 2022-09-01

## 2022-07-08 RX ORDER — ERGOCALCIFEROL 1.25 MG/1
CAPSULE ORAL
Qty: 5 CAPSULE | Refills: 1 | Status: SHIPPED | OUTPATIENT
Start: 2022-07-08 | End: 2022-09-01

## 2022-08-01 RX ORDER — SERTRALINE HYDROCHLORIDE 100 MG/1
TABLET, FILM COATED ORAL
Qty: 56 TABLET | Refills: 2 | Status: SHIPPED | OUTPATIENT
Start: 2022-08-01

## 2022-08-01 RX ORDER — OLANZAPINE 10 MG/1
TABLET ORAL
Qty: 56 TABLET | Refills: 2 | Status: SHIPPED | OUTPATIENT
Start: 2022-08-01 | End: 2022-08-15

## 2022-08-01 RX ORDER — KETOCONAZOLE 20 MG/ML
SHAMPOO TOPICAL
Qty: 120 ML | Refills: 1 | Status: SHIPPED | OUTPATIENT
Start: 2022-08-01 | End: 2022-09-29

## 2022-08-15 ENCOUNTER — OFFICE VISIT (OUTPATIENT)
Dept: FAMILY MEDICINE CLINIC | Age: 57
End: 2022-08-15
Payer: MEDICARE

## 2022-08-15 VITALS
RESPIRATION RATE: 16 BRPM | OXYGEN SATURATION: 97 % | DIASTOLIC BLOOD PRESSURE: 64 MMHG | SYSTOLIC BLOOD PRESSURE: 118 MMHG | HEIGHT: 60 IN | BODY MASS INDEX: 26.86 KG/M2 | HEART RATE: 81 BPM | WEIGHT: 136.8 LBS

## 2022-08-15 DIAGNOSIS — F20.9 SCHIZOPHRENIA, UNSPECIFIED TYPE (HCC): Primary | ICD-10-CM

## 2022-08-15 DIAGNOSIS — E78.5 HYPERLIPIDEMIA, UNSPECIFIED HYPERLIPIDEMIA TYPE: ICD-10-CM

## 2022-08-15 DIAGNOSIS — E03.9 HYPOTHYROIDISM, UNSPECIFIED TYPE: ICD-10-CM

## 2022-08-15 DIAGNOSIS — E55.9 VITAMIN D DEFICIENCY: ICD-10-CM

## 2022-08-15 DIAGNOSIS — F79 INTELLECTUAL DISABILITY: ICD-10-CM

## 2022-08-15 PROCEDURE — 1036F TOBACCO NON-USER: CPT | Performed by: NURSE PRACTITIONER

## 2022-08-15 PROCEDURE — 99214 OFFICE O/P EST MOD 30 MIN: CPT | Performed by: NURSE PRACTITIONER

## 2022-08-15 PROCEDURE — G8427 DOCREV CUR MEDS BY ELIG CLIN: HCPCS | Performed by: NURSE PRACTITIONER

## 2022-08-15 PROCEDURE — G8419 CALC BMI OUT NRM PARAM NOF/U: HCPCS | Performed by: NURSE PRACTITIONER

## 2022-08-15 PROCEDURE — 3017F COLORECTAL CA SCREEN DOC REV: CPT | Performed by: NURSE PRACTITIONER

## 2022-08-15 RX ORDER — OLANZAPINE 10 MG/1
20 TABLET ORAL NIGHTLY
Qty: 56 TABLET | Refills: 5 | Status: SHIPPED | OUTPATIENT
Start: 2022-08-15

## 2022-08-15 ASSESSMENT — PATIENT HEALTH QUESTIONNAIRE - PHQ9
2. FEELING DOWN, DEPRESSED OR HOPELESS: 0
1. LITTLE INTEREST OR PLEASURE IN DOING THINGS: 0
SUM OF ALL RESPONSES TO PHQ QUESTIONS 1-9: 0
SUM OF ALL RESPONSES TO PHQ9 QUESTIONS 1 & 2: 0
DEPRESSION UNABLE TO ASSESS: FUNCTIONAL CAPACITY MOTIVATION LIMITS ACCURACY

## 2022-08-15 ASSESSMENT — ENCOUNTER SYMPTOMS
RESPIRATORY NEGATIVE: 1
EYES NEGATIVE: 1
GASTROINTESTINAL NEGATIVE: 1

## 2022-08-15 NOTE — PROGRESS NOTES
Mark Jane (:  1965) is a 59 y.o. female,Established patient, here for evaluation of the following chief complaint(s):  6 Month Follow-Up (NOT FASTING)         ASSESSMENT/PLAN:  1. Schizophrenia, unspecified type ([address]  2. Hyperlipidemia, unspecified hyperlipidemia type  -     CBC with Auto Differential; Future  -     Comprehensive Metabolic Panel; Future  3. Vitamin D deficiency  -     Vitamin D 25 Hydroxy; Future  4. Mental retardation  -     TSH with Reflex; Future  -     Vitamin B12 & Folate; Future  5. Hypothyroidism, unspecified type   -     TSH with Reflex; Future     Will get the labs from above,  will treat as indicated, will continue with current meds. Will increase olanzapine to 30 mgm daily for increase in behaviors. RTC in 3 months or sooner as needed, Lulu Amaral and her caregivers agree with plan. Subjective   SUBJECTIVE/OBJECTIVE:  HPI   Here today with her caregivers. Generally is going well. Has been having some increased anxiety issues, racking, picking and skin and pulling at her hair, when she gets more upset. Her sister has been ill and the staff thinks this is part of the reason for the increase in behaviors. Lulu Amaral denies any issues. Sleeps most nights without any issues, appetite good. Review of Systems   Constitutional: Negative. HENT: Negative. Eyes: Negative. Respiratory: Negative. Cardiovascular: Negative. Gastrointestinal: Negative. Genitourinary: Negative. Musculoskeletal: Negative. Skin: Negative. Neurological: Negative. Psychiatric/Behavioral: Negative. Objective   Physical Exam  Vitals and nursing note reviewed. Constitutional:       General: She is not in acute distress. Appearance: Normal appearance. She is normal weight. She is not ill-appearing. HENT:      Head: Normocephalic and atraumatic.       Right Ear: Hearing, tympanic membrane, ear canal and external ear normal.      Left Ear: Hearing, tympanic membrane, ear canal and external ear normal.      Nose: Nose normal. No congestion or rhinorrhea. Mouth/Throat:      Mouth: Mucous membranes are moist.      Pharynx: Oropharynx is clear. No oropharyngeal exudate or posterior oropharyngeal erythema. Eyes:      General: No scleral icterus. Right eye: No discharge. Left eye: No discharge. Extraocular Movements: Extraocular movements intact. Conjunctiva/sclera: Conjunctivae normal.      Pupils: Pupils are equal, round, and reactive to light. Funduscopic exam:     Right eye: No hemorrhage, exudate, AV nicking, arteriolar narrowing or papilledema. Venous pulsations present. Left eye: No hemorrhage, exudate, AV nicking, arteriolar narrowing or papilledema. Venous pulsations present. Neck:      Thyroid: No thyroid mass, thyromegaly or thyroid tenderness. Vascular: No carotid bruit. Cardiovascular:      Rate and Rhythm: Normal rate and regular rhythm. Pulses: Normal pulses. Heart sounds: Normal heart sounds. No murmur heard. Pulmonary:      Effort: Pulmonary effort is normal. No respiratory distress. Breath sounds: Normal breath sounds. No wheezing or rhonchi. Abdominal:      General: Abdomen is flat. Bowel sounds are normal. There is no distension. Palpations: Abdomen is soft. There is no mass. Tenderness: There is no abdominal tenderness. Musculoskeletal:         General: No swelling, tenderness, deformity or signs of injury. Normal range of motion. Cervical back: Normal range of motion. No tenderness. Right lower leg: No edema. Left lower leg: No edema. Lymphadenopathy:      Cervical: No cervical adenopathy. Skin:     General: Skin is warm and dry. Capillary Refill: Capillary refill takes less than 2 seconds. Coloration: Skin is not jaundiced. Findings: No rash. Neurological:      General: No focal deficit present.       Mental Status: She is alert. Mental status is at baseline. Cranial Nerves: No cranial nerve deficit. Sensory: No sensory deficit. Motor: No weakness. Coordination: Coordination normal.      Gait: Gait normal.      Deep Tendon Reflexes: Reflexes normal.      Comments: Oriented to person and self, which is her baseline   Psychiatric:         Attention and Perception: Attention and perception normal.         Mood and Affect: Mood and affect normal.         Speech: Speech normal.         Behavior: Behavior is slowed. Behavior is cooperative. Thought Content: Thought content is not paranoid or delusional.         Cognition and Memory: Cognition is impaired. Judgment: Judgment is not impulsive or inappropriate. Comments: No skin picking, pulling of her hair or racking noted during the exam.    She is at her baseline level of neurodevelopmental status                An electronic signature was used to authenticate this note.     --TAHIRA Chowdhury - CNP

## 2022-09-01 RX ORDER — DIVALPROEX SODIUM 250 MG/1
TABLET, DELAYED RELEASE ORAL
Qty: 60 TABLET | Refills: 0 | Status: SHIPPED | OUTPATIENT
Start: 2022-09-01 | End: 2022-09-29

## 2022-09-01 RX ORDER — ERGOCALCIFEROL 1.25 MG/1
CAPSULE ORAL
Qty: 5 CAPSULE | Refills: 0 | Status: SHIPPED | OUTPATIENT
Start: 2022-09-01 | End: 2022-09-29

## 2022-09-26 ENCOUNTER — NURSE ONLY (OUTPATIENT)
Dept: FAMILY MEDICINE CLINIC | Age: 57
End: 2022-09-26
Payer: MEDICARE

## 2022-09-26 DIAGNOSIS — E03.9 HYPOTHYROIDISM, UNSPECIFIED TYPE: ICD-10-CM

## 2022-09-26 DIAGNOSIS — E78.5 HYPERLIPIDEMIA, UNSPECIFIED HYPERLIPIDEMIA TYPE: ICD-10-CM

## 2022-09-26 DIAGNOSIS — F79 INTELLECTUAL DISABILITY: ICD-10-CM

## 2022-09-26 DIAGNOSIS — E55.9 VITAMIN D DEFICIENCY: ICD-10-CM

## 2022-09-26 LAB
A/G RATIO: 1.4 (ref 1.1–2.2)
ALBUMIN SERPL-MCNC: 4 G/DL (ref 3.4–5)
ALP BLD-CCNC: 70 U/L (ref 40–129)
ALT SERPL-CCNC: 9 U/L (ref 10–40)
ANION GAP SERPL CALCULATED.3IONS-SCNC: 11 MMOL/L (ref 3–16)
AST SERPL-CCNC: 10 U/L (ref 15–37)
BASOPHILS ABSOLUTE: 0 K/UL (ref 0–0.2)
BASOPHILS RELATIVE PERCENT: 0.2 %
BILIRUB SERPL-MCNC: <0.2 MG/DL (ref 0–1)
BUN BLDV-MCNC: 9 MG/DL (ref 7–20)
CALCIUM SERPL-MCNC: 9.5 MG/DL (ref 8.3–10.6)
CHLORIDE BLD-SCNC: 105 MMOL/L (ref 99–110)
CHOLESTEROL, TOTAL: 196 MG/DL (ref 0–199)
CO2: 27 MMOL/L (ref 21–32)
CREAT SERPL-MCNC: 0.5 MG/DL (ref 0.6–1.1)
EOSINOPHILS ABSOLUTE: 0 K/UL (ref 0–0.6)
EOSINOPHILS RELATIVE PERCENT: 0.9 %
FOLATE: 9.96 NG/ML (ref 4.78–24.2)
GFR AFRICAN AMERICAN: >60
GFR NON-AFRICAN AMERICAN: >60
GLUCOSE BLD-MCNC: 78 MG/DL (ref 70–99)
HCT VFR BLD CALC: 38.3 % (ref 36–48)
HDLC SERPL-MCNC: 68 MG/DL (ref 40–60)
HEMOGLOBIN: 12.6 G/DL (ref 12–16)
LDL CHOLESTEROL CALCULATED: 110 MG/DL
LYMPHOCYTES ABSOLUTE: 2.1 K/UL (ref 1–5.1)
LYMPHOCYTES RELATIVE PERCENT: 52.9 %
MCH RBC QN AUTO: 29.5 PG (ref 26–34)
MCHC RBC AUTO-ENTMCNC: 33 G/DL (ref 31–36)
MCV RBC AUTO: 89.4 FL (ref 80–100)
MONOCYTES ABSOLUTE: 0.2 K/UL (ref 0–1.3)
MONOCYTES RELATIVE PERCENT: 5.7 %
NEUTROPHILS ABSOLUTE: 1.6 K/UL (ref 1.7–7.7)
NEUTROPHILS RELATIVE PERCENT: 40.3 %
PDW BLD-RTO: 13.9 % (ref 12.4–15.4)
PLATELET # BLD: 206 K/UL (ref 135–450)
PMV BLD AUTO: 8.9 FL (ref 5–10.5)
POTASSIUM SERPL-SCNC: 4.6 MMOL/L (ref 3.5–5.1)
RBC # BLD: 4.28 M/UL (ref 4–5.2)
SODIUM BLD-SCNC: 143 MMOL/L (ref 136–145)
TOTAL PROTEIN: 6.8 G/DL (ref 6.4–8.2)
TRIGL SERPL-MCNC: 92 MG/DL (ref 0–150)
TSH REFLEX: 1.2 UIU/ML (ref 0.27–4.2)
VITAMIN B-12: 576 PG/ML (ref 211–911)
VITAMIN D 25-HYDROXY: 76.8 NG/ML
VLDLC SERPL CALC-MCNC: 18 MG/DL
WBC # BLD: 4 K/UL (ref 4–11)

## 2022-09-26 PROCEDURE — 36415 COLL VENOUS BLD VENIPUNCTURE: CPT | Performed by: NURSE PRACTITIONER

## 2022-09-29 RX ORDER — KETOCONAZOLE 20 MG/ML
SHAMPOO TOPICAL
Qty: 120 ML | Refills: 0 | Status: SHIPPED | OUTPATIENT
Start: 2022-09-29 | End: 2022-11-04

## 2022-09-29 RX ORDER — DIVALPROEX SODIUM 250 MG/1
TABLET, DELAYED RELEASE ORAL
Qty: 60 TABLET | Refills: 0 | Status: SHIPPED | OUTPATIENT
Start: 2022-09-29 | End: 2022-11-04

## 2022-09-29 RX ORDER — ERGOCALCIFEROL 1.25 MG/1
CAPSULE ORAL
Qty: 5 CAPSULE | Refills: 0 | Status: SHIPPED | OUTPATIENT
Start: 2022-09-29 | End: 2022-11-04

## 2022-11-03 ENCOUNTER — TELEPHONE (OUTPATIENT)
Dept: FAMILY MEDICINE CLINIC | Age: 57
End: 2022-11-03

## 2022-11-03 NOTE — TELEPHONE ENCOUNTER
Leo Worrell says that patient has lost weight and is needing small pull ups. Asking if Samuel Smith CNP would send an order [facility] pull [facility]

## 2022-11-04 RX ORDER — KETOCONAZOLE 20 MG/ML
SHAMPOO TOPICAL
Qty: 120 ML | Refills: 0 | Status: SHIPPED | OUTPATIENT
Start: 2022-11-04

## 2022-11-04 RX ORDER — ERGOCALCIFEROL 1.25 MG/1
CAPSULE ORAL
Qty: 5 CAPSULE | Refills: 0 | Status: SHIPPED | OUTPATIENT
Start: 2022-11-04

## 2022-11-04 RX ORDER — DIVALPROEX SODIUM 250 MG/1
TABLET, DELAYED RELEASE ORAL
Qty: 60 TABLET | Refills: 0 | Status: SHIPPED | OUTPATIENT
Start: 2022-11-04

## 2022-11-10 NOTE — TELEPHONE ENCOUNTER
Caregiver is requesting prescriptions to take prn. Script for allergies(need to state allergy symptoms), prn tylenol to take as needed for pain, ibuprofen for fever, a decongestant, something for heartburn and upset stomach, hydrocortisone for itching,rash, or kenan bite. [facility] to [facility] [place]. Each script need to state why they are being given.

## 2022-11-28 RX ORDER — LOPERAMIDE HYDROCHLORIDE 2 MG/1
CAPSULE ORAL
Qty: 12 CAPSULE | Refills: 0 | Status: SHIPPED | OUTPATIENT
Start: 2022-11-28

## 2022-11-28 RX ORDER — IBUPROFEN 200 MG
CAPSULE ORAL
Qty: 28.4 G | Refills: 0 | Status: SHIPPED | OUTPATIENT
Start: 2022-11-28

## 2022-11-28 RX ORDER — IBUPROFEN 200 MG
TABLET ORAL
Qty: 120 TABLET | Refills: 2 | Status: SHIPPED | OUTPATIENT
Start: 2022-11-28

## 2022-11-28 RX ORDER — LACTULOSE 10 G/15ML
SOLUTION ORAL
Qty: 473 ML | Refills: 0 | Status: SHIPPED | OUTPATIENT
Start: 2022-11-28

## 2022-11-28 RX ORDER — PSEUDOEPHEDRINE HCL 30 MG
TABLET ORAL
Qty: 24 TABLET | Refills: 0 | Status: SHIPPED | OUTPATIENT
Start: 2022-11-28

## 2022-11-28 RX ORDER — ACETAMINOPHEN 325 MG/1
TABLET ORAL
Qty: 120 TABLET | Refills: 2 | Status: SHIPPED | OUTPATIENT
Start: 2022-11-28

## 2022-11-28 NOTE — TELEPHONE ENCOUNTER
Called Dayna back who states they need updated prn meds sent to the [address] in [place]. I will pend medications.

## 2022-12-02 RX ORDER — ERGOCALCIFEROL 1.25 MG/1
CAPSULE ORAL
Qty: 5 CAPSULE | Refills: 0 | Status: SHIPPED | OUTPATIENT
Start: 2022-12-02

## 2022-12-02 RX ORDER — DIVALPROEX SODIUM 250 MG/1
TABLET, DELAYED RELEASE ORAL
Qty: 60 TABLET | Refills: 0 | Status: SHIPPED | OUTPATIENT
Start: 2022-12-02

## 2022-12-02 RX ORDER — KETOCONAZOLE 20 MG/ML
SHAMPOO TOPICAL
Qty: 120 ML | Refills: 0 | Status: SHIPPED | OUTPATIENT
Start: 2022-12-02

## 2022-12-30 RX ORDER — DIVALPROEX SODIUM 250 MG/1
TABLET, DELAYED RELEASE ORAL
Qty: 60 TABLET | Refills: 0 | Status: SHIPPED | OUTPATIENT
Start: 2022-12-30

## 2022-12-30 RX ORDER — SERTRALINE HYDROCHLORIDE 100 MG/1
TABLET, FILM COATED ORAL
Qty: 56 TABLET | Refills: 1 | Status: SHIPPED | OUTPATIENT
Start: 2022-12-30

## 2022-12-30 RX ORDER — KETOCONAZOLE 20 MG/ML
SHAMPOO TOPICAL
Qty: 120 ML | Refills: 0 | Status: SHIPPED | OUTPATIENT
Start: 2022-12-30

## 2022-12-30 RX ORDER — ERGOCALCIFEROL 1.25 MG/1
CAPSULE ORAL
Qty: 4 CAPSULE | Refills: 0 | Status: SHIPPED | OUTPATIENT
Start: 2022-12-30

## 2023-01-31 RX ORDER — ERGOCALCIFEROL 1.25 MG/1
CAPSULE ORAL
Qty: 4 CAPSULE | Refills: 0 | Status: SHIPPED | OUTPATIENT
Start: 2023-01-31

## 2023-01-31 RX ORDER — DIVALPROEX SODIUM 250 MG/1
TABLET, DELAYED RELEASE ORAL
Qty: 60 TABLET | Refills: 0 | Status: SHIPPED | OUTPATIENT
Start: 2023-01-31

## 2023-01-31 RX ORDER — KETOCONAZOLE 20 MG/ML
SHAMPOO TOPICAL
Qty: 120 ML | Refills: 0 | Status: SHIPPED | OUTPATIENT
Start: 2023-01-31

## 2023-02-01 RX ORDER — OLANZAPINE 10 MG/1
20 TABLET ORAL NIGHTLY
Qty: 56 TABLET | Refills: 4 | Status: SHIPPED | OUTPATIENT
Start: 2023-02-01

## 2023-02-20 ENCOUNTER — OFFICE VISIT (OUTPATIENT)
Dept: FAMILY MEDICINE CLINIC | Age: 58
End: 2023-02-20
Payer: MEDICARE

## 2023-02-20 VITALS
OXYGEN SATURATION: 98 % | BODY MASS INDEX: 28.32 KG/M2 | SYSTOLIC BLOOD PRESSURE: 128 MMHG | DIASTOLIC BLOOD PRESSURE: 70 MMHG | HEART RATE: 77 BPM | WEIGHT: 145 LBS | TEMPERATURE: 98.3 F

## 2023-02-20 DIAGNOSIS — E55.9 VITAMIN D DEFICIENCY: ICD-10-CM

## 2023-02-20 DIAGNOSIS — F79 INTELLECTUAL DISABILITY: ICD-10-CM

## 2023-02-20 DIAGNOSIS — F42.8 OTHER OBSESSIVE-COMPULSIVE DISORDERS: ICD-10-CM

## 2023-02-20 DIAGNOSIS — Z23 INFLUENZA VACCINATION ADMINISTERED AT CURRENT VISIT: ICD-10-CM

## 2023-02-20 DIAGNOSIS — F20.9 SCHIZOPHRENIA, UNSPECIFIED TYPE (HCC): Primary | ICD-10-CM

## 2023-02-20 DIAGNOSIS — E78.5 HYPERLIPIDEMIA, UNSPECIFIED HYPERLIPIDEMIA TYPE: ICD-10-CM

## 2023-02-20 DIAGNOSIS — Z12.11 COLON CANCER SCREENING: ICD-10-CM

## 2023-02-20 DIAGNOSIS — R26.89 NEED FOR ASSISTANCE DUE TO UNSTEADY GAIT: ICD-10-CM

## 2023-02-20 DIAGNOSIS — K59.04 CHRONIC IDIOPATHIC CONSTIPATION: ICD-10-CM

## 2023-02-20 PROCEDURE — G8482 FLU IMMUNIZE ORDER/ADMIN: HCPCS | Performed by: NURSE PRACTITIONER

## 2023-02-20 PROCEDURE — 90674 CCIIV4 VAC NO PRSV 0.5 ML IM: CPT | Performed by: NURSE PRACTITIONER

## 2023-02-20 PROCEDURE — G0008 ADMIN INFLUENZA VIRUS VAC: HCPCS | Performed by: NURSE PRACTITIONER

## 2023-02-20 PROCEDURE — G8419 CALC BMI OUT NRM PARAM NOF/U: HCPCS | Performed by: NURSE PRACTITIONER

## 2023-02-20 PROCEDURE — 3017F COLORECTAL CA SCREEN DOC REV: CPT | Performed by: NURSE PRACTITIONER

## 2023-02-20 PROCEDURE — 1036F TOBACCO NON-USER: CPT | Performed by: NURSE PRACTITIONER

## 2023-02-20 PROCEDURE — G8427 DOCREV CUR MEDS BY ELIG CLIN: HCPCS | Performed by: NURSE PRACTITIONER

## 2023-02-20 PROCEDURE — 99214 OFFICE O/P EST MOD 30 MIN: CPT | Performed by: NURSE PRACTITIONER

## 2023-02-20 SDOH — ECONOMIC STABILITY: FOOD INSECURITY: WITHIN THE PAST 12 MONTHS, YOU WORRIED THAT YOUR FOOD WOULD RUN OUT BEFORE YOU GOT MONEY TO BUY MORE.: NEVER TRUE

## 2023-02-20 SDOH — ECONOMIC STABILITY: HOUSING INSECURITY
IN THE LAST 12 MONTHS, WAS THERE A TIME WHEN YOU DID NOT HAVE A STEADY PLACE TO SLEEP OR SLEPT IN A SHELTER (INCLUDING NOW)?: NO

## 2023-02-20 SDOH — ECONOMIC STABILITY: FOOD INSECURITY: WITHIN THE PAST 12 MONTHS, THE FOOD YOU BOUGHT JUST DIDN'T LAST AND YOU DIDN'T HAVE MONEY TO GET MORE.: NEVER TRUE

## 2023-02-20 SDOH — ECONOMIC STABILITY: INCOME INSECURITY: HOW HARD IS IT FOR YOU TO PAY FOR THE VERY BASICS LIKE FOOD, HOUSING, MEDICAL CARE, AND HEATING?: NOT HARD AT ALL

## 2023-02-20 ASSESSMENT — ENCOUNTER SYMPTOMS
CONSTIPATION: 0
SHORTNESS OF BREATH: 0
CHEST TIGHTNESS: 0
DIARRHEA: 0
BLOOD IN STOOL: 0
COUGH: 0

## 2023-02-20 ASSESSMENT — PATIENT HEALTH QUESTIONNAIRE - PHQ9: DEPRESSION UNABLE TO ASSESS: FUNCTIONAL CAPACITY MOTIVATION LIMITS ACCURACY

## 2023-02-20 NOTE — PROGRESS NOTES
2/20/2023    Chief Complaint   Patient presents with    Establish Care    Hypothyroidism    Hyperlipidemia    Schizophrenia       Sanaz Alexander is a 59 y.o. female, presents today for:      ASSESSMENT/PLAN:    1. Schizophrenia, unspecified type ([address]  Stable today, behaviors improved after increased dose of Zyprexa last OV  Continue Olanzapine 20, Sertraline 100, Depakote 250 mg BID  Due for annual surveillance labs next OV    2. Hyperlipidemia, unspecified hyperlipidemia type  Stable  LDL 9/2022 110  ASCVD 9/2022 2.5%  Annual labs due next OV     3. Other obsessive-compulsive disorders  Stable today  See above    4. Vitamin D deficiency  Stable today  Continue OTC Vitamin D 2000 IU    5. Chronic idiopathic constipation  Stable today  Continue PRN Lactulose, Milk of Magnesia    6. Mental retardation  See above    7. Need for assistance due to unsteady gait  Stable today  Continue walker use  Encouraged continued home safety    8. Colon cancer screening  Cologuard ordered today  - Fecal DNA Colorectal cancer screening (Cologuard)    9. Influenza vaccination administered at current visit  Influenza vaccination given today. - Influenza, FLUCELVAX, (age 10 mo+), IM, Preservative Free, 0.5 mL    Return in about 6 months (around 8/20/2023) for awv, hld, hypothyroid, come fasting. Presenting today to establish care. Prior PCP retired. No new questions/ concerns. Here today with care provider Karel. Shizophrenia- continues to get aggrvataed at little things but not bad out in public like prior to zyprexa increase. Continues to take Sertraline daily. Hypothyroid- No hypo/ hyper thyroid symptoms. Taking Synthroid on an empty stomach. HLD: No sensory loss, leg myalgias. Not currently on medications for cholesterol   Constipation: Currently well controlled. No change in diet over the past several months. No change in appetite. Having a bowel movement every 2-3 days.   Takes Lactulose/ Milk of Magnesia as needed. Reports left medial knee pain. She fell several weeks ago while at workshop per her roommate. No incident report was placed. Poor historian regarding pain. Unclear when pain occurs and how to describe it. No issues walking. No swelling, redness. Unable to swallow pills- chews all pills. Cervical Cancer Screening: See OB/ GYN, unable to do screening but will do breast exams. PHQ Scores 8/15/2022 1/31/2022 7/27/2020 12/9/2019 1/29/2018 4/10/2017   PHQ2 Score 0 0 0 0 0 0   PHQ9 Score 0 0 0 0 0 0     Interpretation of Total Score Depression Severity: 1-4 = Minimal depression, 5-9 = Mild depression, 10-14 = Moderate depression, 15-19 = Moderately severe depression, 20-27 = Severe depression      Lab Results   Component Value Date     09/26/2022    K 4.6 09/26/2022     09/26/2022    CO2 27 09/26/2022    BUN 9 09/26/2022    CREATININE 0.5 (L) 09/26/2022    GLUCOSE 78 09/26/2022    CALCIUM 9.5 09/26/2022    PROT 6.8 09/26/2022    LABALBU 4.0 09/26/2022    BILITOT <0.2 09/26/2022    ALKPHOS 70 09/26/2022    AST 10 (L) 09/26/2022    ALT 9 (L) 09/26/2022    LABGLOM >60 09/26/2022    GFRAA >60 09/26/2022    AGRATIO 1.4 09/26/2022    GLOB 2.6 10/02/2020         Review of Systems   Constitutional: Negative. Respiratory:  Negative for cough, chest tightness and shortness of breath. Cardiovascular: Negative. Gastrointestinal:  Negative for blood in stool, constipation and diarrhea. Skin: Negative. Neurological:  Negative for dizziness, tremors, light-headedness and headaches. Psychiatric/Behavioral:  Negative for decreased concentration, dysphoric mood, self-injury, sleep disturbance and suicidal ideas. The patient is not nervous/anxious.       Current Outpatient Medications on File Prior to Visit   Medication Sig Dispense Refill    OLANZapine (ZYPREXA) 10 MG tablet TAKE 2 TABLETS BY MOUTH NIGHTLY 56 tablet 4    ketoconazole (NIZORAL) 2 % shampoo APPLY TOPICALLY TWICE A WEEK FOR DANDRUFF. 120 mL 0    divalproex (DEPAKOTE) 250 MG DR tablet TAKE 1 TABLET BY MOUTH 2 TIMES A DAY. ONCE IN THE MORNING AT 6:30 AND ONCE IN THE EVENING AT 5PM. 60 tablet 0    vitamin D (ERGOCALCIFEROL) 1.25 MG (38790 UT) CAPS capsule TAKE 1 CAPSULE BY MOUTH ONCE A WEEK ON WEDNESDAYS AT 6:30AM 4 capsule 0    sertraline (ZOLOFT) 100 MG tablet TAKE 1 TABLET BY MOUTH DAILY AT 5PM FOR MANIC EPISODES 56 tablet 1    acetaminophen (MAPAP) 325 MG tablet Take two (2) tablets orally (by mouth) every 4 hours as needed for pain  HEADACHE, OR ELEVATED TEMPERATURE 120 tablet 2    ibuprofen (ADVIL;MOTRIN) 200 MG tablet Take two (2) tablets orally (by mouth) every 4 hours as needed for pain 120 tablet 2    pseudoephedrine (SUDOGEST) 30 MG tablet TAKE 1 TABLETS BY MOUTH EVERY 4 HOURS AS NEEDED FOR CONGESTION 24 tablet 0    lactulose (CHRONULAC) 10 GM/15ML solution TAKE 1 TABLESPOONFUL BY MOUTH 3 TIMES A DAY AS NEEDED FOR CONSTIPATION 473 mL 0    loperamide (IMODIUM) 2 MG capsule TAKE 2 TABLETS STAT THEN 1 TABLET 4 TIMES DAILY AS NEEDED FOR DIARRHEA 12 capsule 0    magnesium hydroxide (MILK OF MAGNESIA) 400 MG/5ML suspension TAKE 30ML BY MOUTH AT BEDTIME AS NEEDED  IF NO BM FOR 2 DAYS (CONSTIPATION) 355 mL 0    neomycin-bacitracin-polymyxin (NEOSPORIN) 3.5-400-5000 OINT ointment APPLY TOPICALLY TO CUTS, ABRASIONS, SCRAPES, OR SCRATCHES 4 TIMES DAILY AS NEEDED 28.4 g 0    Disposable Gloves MISC Use as directed 1 each 11    Incontinence Supply Disposable (DEPEND FIT-FLEX WOMENS/SMALL) MISC Use as needed 200 each 11    Incontinence Supply Disposable (COTTONELLE MOIST WIPES) MISC Use as needed 1 each 11    cephALEXin (KEFLEX) 250 MG capsule Take 2 capsules by mouth 3 times daily 60 capsule 0    SUNSCREEN SPF50 LOTN [id number] TOPICALLY 3 TIMES DAILY AS NEEDED FOR INSECT REPELLANT 2 Bottle 5    Diethyltoluamide (JOHNSON INSECT REPELLENT) 30 % AERO Apply 1 each topically three times daily 1 Can 5    Misc.  Devices (WALL GRAB BAR) MISC Dispense 1 grab bar to be used in shower to prevent falls 1 each 0    ANTACID MAXIMUM STRENGTH 400-400-40 MG/5ML SUSP TAKE 30ML BY MOUTH EVERY 4 HOURS AS NEEDED FOR UPSET STOMACH 355 mL 0    Calamine 8-8 % LOTN lotion APPLY TOPICALLY TO AFFECTED AREA 3 TIMES DAILY AS NEEDED FOR MILD SKINIRRITATION OR RASHES 177 mL 0    CHLORASEPTIC 6-10 MG LOZG lozenge USE 1 LOZENGE BY MOUTH EVERY 2 HOURS AS NEEDED FOR SORE THROAT 30 lozenge 3    ANTACID 500 MG chewable tablet CHEW 2 TABLETS BY MOUTH 6 TIMES DAILY AS NEEDED FOR HEARTBURN 360 tablet 0    Ca Carbonate-Mag Hydroxide (ANTACID) 550-110 MG CHEW Take 2 tablets by mouth 6 times daily 360 tablet 5    Disposable Gloves (VINYL GLOVES ONE SIZE) MISC 2 each by Does not apply route 4 times daily 200 each 5    Disposable Gloves (LATEX GLOVES) MISC Use as directed 200 each 5     No current facility-administered medications on file prior to visit. No Known Allergies  Past Medical History:   Diagnosis Date    Constipation     Hyperlipidemia     Mental retardation, profound (I.Q. < 20)     OCD (obsessive compulsive disorder)     Schizophrenia ([facility] [address]     Vitamin D deficiency      History reviewed. No pertinent surgical history. Social History     Tobacco Use    Smoking status: Never    Smokeless tobacco: Never   Substance Use Topics    Alcohol use: No     Family History   Family history unknown: Yes       Vitals:    02/20/23 1016   BP: 128/70   Pulse: 77   Temp: 98.3 °F (36.8 °C)   TempSrc: Infrared   SpO2: 98%   Weight: 145 lb (65.8 kg)     Estimated body mass index is 28.32 kg/m² as calculated from the following:    Height as of 8/15/22: 5' (1.524 m). Weight as of this encounter: 145 lb (65.8 kg).     The 10-year ASCVD risk score (Mitzi CHAUDHRY, et al., 2019) is: 2.5%    Values used to calculate the score:      Age: 59 years      Sex: Female      Is Non- : No      Diabetic: No      Tobacco smoker: No      Systolic Blood Pressure: 745 mmHg      Is BP treated: No      HDL Cholesterol: 68 mg/dL      Total Cholesterol: 196 mg/dL      Physical Exam  Vitals and nursing note reviewed. Constitutional:       General: She is not in acute distress. Appearance: Normal appearance. She is normal weight. She is not ill-appearing. HENT:      Head: Normocephalic and atraumatic. Right Ear: Hearing normal.      Left Ear: Hearing normal.      Nose: Nose normal.      Mouth/Throat:      Mouth: Mucous membranes are moist.      Pharynx: Oropharynx is clear. Eyes:      Extraocular Movements: Extraocular movements intact. Conjunctiva/sclera: Conjunctivae normal.      Pupils: Pupils are equal, round, and reactive to light. Funduscopic exam:     Right eye: No arteriolar narrowing or papilledema. Left eye: No arteriolar narrowing or papilledema. Neck:      Thyroid: No thyroid mass, thyromegaly or thyroid tenderness. Vascular: No carotid bruit. Cardiovascular:      Rate and Rhythm: Normal rate and regular rhythm. Pulses: Normal pulses. Heart sounds: Normal heart sounds. No murmur heard. Pulmonary:      Effort: Pulmonary effort is normal. No respiratory distress. Breath sounds: Normal breath sounds. No wheezing or rhonchi. Abdominal:      General: Abdomen is flat. Bowel sounds are normal. There is no distension. Palpations: Abdomen is soft. There is no mass. Tenderness: There is no abdominal tenderness. Musculoskeletal:         General: No swelling, tenderness, deformity or signs of injury. Normal range of motion. Cervical back: Normal range of motion. No tenderness. Right hip: Normal.      Left hip: Normal.      Right knee: Crepitus present. No swelling, deformity, effusion, erythema, ecchymosis, lacerations or bony tenderness. Normal range of motion. No tenderness. No LCL laxity, MCL laxity, ACL laxity or PCL laxity. Normal alignment, normal meniscus and normal patellar mobility. Normal pulse.       Instability Tests: Anterior drawer test negative. Posterior drawer test negative. Anterior Lachman test negative. Medial Ramila test negative and lateral Ramila test negative. Left knee: Crepitus present. No swelling, deformity, effusion, erythema, ecchymosis, lacerations or bony tenderness. Normal range of motion. No tenderness. No LCL laxity, MCL laxity, ACL laxity or PCL laxity. Normal alignment, normal meniscus and normal patellar mobility. Normal pulse. Instability Tests: Anterior drawer test negative. Posterior drawer test negative. Anterior Lachman test negative. Medial Ramila test negative and lateral Ramila test negative. Right lower leg: No edema. Left lower leg: No edema. Right ankle: Normal.      Left ankle: Normal.   Lymphadenopathy:      Cervical: No cervical adenopathy. Skin:     General: Skin is warm and dry. Capillary Refill: Capillary refill takes less than 2 seconds. Coloration: Skin is not jaundiced. Findings: No rash. Neurological:      General: No focal deficit present. Mental Status: She is alert. Mental status is at baseline. Cranial Nerves: No cranial nerve deficit. Sensory: No sensory deficit. Motor: No weakness. Coordination: Coordination normal.      Gait: Gait normal.      Deep Tendon Reflexes: Reflexes normal.      Comments: Oriented to person and self, which is her baseline   Psychiatric:         Attention and Perception: Attention and perception normal.         Mood and Affect: Mood and affect normal.         Speech: Speech normal.         Behavior: Behavior is slowed. Behavior is cooperative. Thought Content: Thought content is not paranoid or delusional.         Cognition and Memory: Cognition is impaired. Judgment: Judgment is not impulsive or inappropriate.       Comments: No skin picking, pulling of her hair or racking noted during the exam.    She is at her baseline level of neurodevelopmental status         Patient's questions answered and concerns addressed. Patient agrees to plan of care.        Electronically signed by TAHIRA Herman CNP on 2/20/2023 at 7:30 PM

## 2023-03-02 NOTE — TELEPHONE ENCOUNTER
Future appt scheduled 08/24/2023                Last appt 02/20/2023      Last Written     vitamin D (ERGOCALCIFEROL) 1.25 MG (25949 UT) CAPS capsule  01/31/2023  #4 capsule   0 RF       divalproex (DEPAKOTE) 250 MG DR tablet  01/31/2023  #60  0 RF     ketoconazole (NIZORAL) 2 % shampoo  01/31/2023  120 mL  0 RF

## 2023-03-03 RX ORDER — ERGOCALCIFEROL 1.25 MG/1
CAPSULE ORAL
Qty: 4 CAPSULE | Refills: 0 | Status: SHIPPED | OUTPATIENT
Start: 2023-03-03

## 2023-03-03 RX ORDER — DIVALPROEX SODIUM 250 MG/1
TABLET, DELAYED RELEASE ORAL
Qty: 60 TABLET | Refills: 0 | Status: SHIPPED | OUTPATIENT
Start: 2023-03-03

## 2023-03-03 RX ORDER — KETOCONAZOLE 20 MG/ML
SHAMPOO TOPICAL
Qty: 120 ML | Refills: 0 | Status: SHIPPED | OUTPATIENT
Start: 2023-03-03

## 2023-03-21 ENCOUNTER — HOSPITAL ENCOUNTER (OUTPATIENT)
Dept: MAMMOGRAPHY | Age: 58
Discharge: HOME | End: 2023-03-21
Payer: MEDICARE

## 2023-03-21 DIAGNOSIS — Z12.39 SCREENING BREAST EXAMINATION: ICD-10-CM

## 2023-03-21 PROCEDURE — 77063 BREAST TOMOSYNTHESIS BI: CPT

## 2023-03-30 RX ORDER — SERTRALINE HYDROCHLORIDE 100 MG/1
TABLET, FILM COATED ORAL
Qty: 56 TABLET | Refills: 11 | Status: SHIPPED | OUTPATIENT
Start: 2023-03-30

## 2023-03-30 RX ORDER — DIVALPROEX SODIUM 250 MG/1
TABLET, DELAYED RELEASE ORAL
Qty: 60 TABLET | Refills: 6 | Status: SHIPPED | OUTPATIENT
Start: 2023-03-30

## 2023-03-30 RX ORDER — KETOCONAZOLE 20 MG/ML
SHAMPOO TOPICAL
Qty: 120 ML | Refills: 11 | Status: SHIPPED | OUTPATIENT
Start: 2023-03-30

## 2023-03-30 RX ORDER — ERGOCALCIFEROL 1.25 MG/1
CAPSULE ORAL
Qty: 4 CAPSULE | Refills: 11 | Status: SHIPPED | OUTPATIENT
Start: 2023-03-30

## 2023-05-26 RX ORDER — OLANZAPINE 10 MG/1
20 TABLET ORAL NIGHTLY
Qty: 56 TABLET | Refills: 3 | Status: SHIPPED | OUTPATIENT
Start: 2023-05-26

## 2023-05-26 NOTE — TELEPHONE ENCOUNTER
Future appt scheduled 08/24/2023                  Last appt 02/20/2023      Last Written 02/01/2023    OLANZapine (ZYPREXA) 10 MG tablet  #56  4 RF

## 2023-08-28 RX ORDER — OLANZAPINE 10 MG/1
20 TABLET ORAL NIGHTLY
Qty: 56 TABLET | Refills: 3 | Status: SHIPPED | OUTPATIENT
Start: 2023-08-28

## 2023-09-13 ENCOUNTER — OFFICE VISIT (OUTPATIENT)
Dept: FAMILY MEDICINE CLINIC | Age: 58
End: 2023-09-13
Payer: MEDICARE

## 2023-09-13 VITALS
HEART RATE: 85 BPM | TEMPERATURE: 97.6 F | DIASTOLIC BLOOD PRESSURE: 68 MMHG | SYSTOLIC BLOOD PRESSURE: 102 MMHG | OXYGEN SATURATION: 97 % | WEIGHT: 147 LBS | BODY MASS INDEX: 28.71 KG/M2

## 2023-09-13 DIAGNOSIS — E78.5 HYPERLIPIDEMIA, UNSPECIFIED HYPERLIPIDEMIA TYPE: ICD-10-CM

## 2023-09-13 DIAGNOSIS — E03.9 HYPOTHYROIDISM, UNSPECIFIED TYPE: ICD-10-CM

## 2023-09-13 DIAGNOSIS — Z13.820 ENCOUNTER FOR OSTEOPOROSIS SCREENING IN ASYMPTOMATIC POSTMENOPAUSAL PATIENT: ICD-10-CM

## 2023-09-13 DIAGNOSIS — Z00.00 MEDICARE ANNUAL WELLNESS VISIT, SUBSEQUENT: Primary | ICD-10-CM

## 2023-09-13 DIAGNOSIS — R39.9 UTI SYMPTOMS: ICD-10-CM

## 2023-09-13 DIAGNOSIS — F79 INTELLECTUAL DISABILITY: ICD-10-CM

## 2023-09-13 DIAGNOSIS — Z12.31 SCREENING MAMMOGRAM, ENCOUNTER FOR: ICD-10-CM

## 2023-09-13 DIAGNOSIS — F42.4 DERMATILLOMANIA IN ADULT: ICD-10-CM

## 2023-09-13 DIAGNOSIS — Z78.0 ENCOUNTER FOR OSTEOPOROSIS SCREENING IN ASYMPTOMATIC POSTMENOPAUSAL PATIENT: ICD-10-CM

## 2023-09-13 DIAGNOSIS — E55.9 VITAMIN D DEFICIENCY: ICD-10-CM

## 2023-09-13 DIAGNOSIS — F20.9 SCHIZOPHRENIA, UNSPECIFIED TYPE (HCC): ICD-10-CM

## 2023-09-13 PROCEDURE — G0439 PPPS, SUBSEQ VISIT: HCPCS | Performed by: NURSE PRACTITIONER

## 2023-09-13 PROCEDURE — 3017F COLORECTAL CA SCREEN DOC REV: CPT | Performed by: NURSE PRACTITIONER

## 2023-09-13 RX ORDER — OLANZAPINE 20 MG/1
20 TABLET ORAL NIGHTLY
Qty: 30 TABLET | Refills: 11 | Status: SHIPPED | OUTPATIENT
Start: 2023-09-13

## 2023-09-13 RX ORDER — SERTRALINE HYDROCHLORIDE 100 MG/1
150 TABLET, FILM COATED ORAL DAILY
Qty: 45 TABLET | Refills: 11 | Status: SHIPPED | OUTPATIENT
Start: 2023-09-13 | End: 2024-09-07

## 2023-09-13 ASSESSMENT — PATIENT HEALTH QUESTIONNAIRE - PHQ9
1. LITTLE INTEREST OR PLEASURE IN DOING THINGS: 0
SUM OF ALL RESPONSES TO PHQ QUESTIONS 1-9: 0
DEPRESSION UNABLE TO ASSESS: FUNCTIONAL CAPACITY MOTIVATION LIMITS ACCURACY
2. FEELING DOWN, DEPRESSED OR HOPELESS: 0
SUM OF ALL RESPONSES TO PHQ9 QUESTIONS 1 & 2: 0
SUM OF ALL RESPONSES TO PHQ QUESTIONS 1-9: 0

## 2023-09-13 ASSESSMENT — LIFESTYLE VARIABLES
HOW OFTEN DO YOU HAVE A DRINK CONTAINING ALCOHOL: NEVER
HOW MANY STANDARD DRINKS CONTAINING ALCOHOL DO YOU HAVE ON A TYPICAL DAY: PATIENT DOES NOT DRINK

## 2023-09-13 NOTE — PATIENT INSTRUCTIONS
today your provider may have ordered immunizations, labs, imaging, and/or referrals for you. A list of these orders (if applicable) as well as your Preventive Care list are included within your After Visit Summary for your review. Other Preventive Recommendations:    A preventive eye exam performed by an eye specialist is recommended every 1-2 years to screen for glaucoma; cataracts, macular degeneration, and other eye disorders. A preventive dental visit is recommended every 6 months. Try to get at least 150 minutes of exercise per week or 10,000 steps per day on a pedometer . Order or download the FREE \"Exercise & Physical Activity: Your Everyday Guide\" from [employer]. Call 4-795.938.6095 or search [employer] online. You need 1877-0869 mg of calcium and 4223-2764 IU of vitamin D per day. It is possible to meet your calcium requirement with diet alone, but a vitamin D supplement is usually necessary to meet this goal.  When exposed to the sun, use a sunscreen that protects against both UVA and UVB radiation with an SPF of 30 or greater. Reapply every 2 to 3 hours or after sweating, drying off with a towel, or swimming. Always wear a seat belt when traveling in a car. Always wear a helmet when riding a bicycle or motorcycle.

## 2023-09-13 NOTE — PROGRESS NOTES
TOPICALLY TO AFFECTED AREA 3 TIMES DAILY AS NEEDED FOR MILD SKINIRRITATION OR RASHES Yes TAHIRA Daniel CNP   CHLORASEPTIC 6-10 MG LOZG lozenge USE 1 LOZENGE BY MOUTH EVERY 2 HOURS AS NEEDED FOR SORE THROAT Yes TAHIRA Daniel CNP   ANTACID 500 MG chewable tablet CHEW 2 TABLETS BY MOUTH 6 TIMES DAILY AS NEEDED FOR HEARTBURN Yes TAHIRA Daniel CNP   Ca Carbonate-Mag Hydroxide (ANTACID) 550-110 MG CHEW Take 2 tablets by mouth 6 times daily Yes TAHIRA Daniel CNP   Disposable Gloves (VINYL GLOVES ONE SIZE) MISC 2 each by Does not apply route 4 times daily Yes TAHIRA Daniel CNP   Disposable Gloves (LATEX GLOVES) MISC Use as directed Yes TAHIRA Infante CNP       CareTeam (Including outside providers/suppliers regularly involved in providing care):   Patient Care Team:  TAHIRA Martinez CNP as PCP - General (Nurse Practitioner)  TAHIRA Martinez CNP as PCP - Empaneled Provider     Reviewed and updated this visit:  Allergies  Meds

## 2023-09-18 ENCOUNTER — HOSPITAL ENCOUNTER (OUTPATIENT)
Age: 58
Setting detail: SPECIMEN
Discharge: HOME | End: 2023-09-18
Payer: MEDICARE

## 2023-09-18 DIAGNOSIS — R39.9 UTI SYMPTOMS: ICD-10-CM

## 2023-09-18 LAB
BACTERIA URNS QL MICRO: ABNORMAL /HPF
BILIRUB UR QL STRIP.AUTO: NEGATIVE
CLARITY UR: CLEAR
COLOR UR: YELLOW
EPI CELLS #/AREA URNS HPF: ABNORMAL /HPF (ref 0–5)
GLUCOSE UR STRIP.AUTO-MCNC: NEGATIVE MG/DL
HGB UR QL STRIP.AUTO: NEGATIVE
KETONES UR STRIP.AUTO-MCNC: NEGATIVE MG/DL
LEUKOCYTE ESTERASE UR QL STRIP.AUTO: ABNORMAL
NITRITE UR QL STRIP.AUTO: NEGATIVE
PH UR STRIP.AUTO: 7.5 [PH] (ref 5–8)
PROT UR STRIP.AUTO-MCNC: NEGATIVE MG/DL
RBC #/AREA URNS HPF: ABNORMAL /HPF (ref 0–4)
SP GR UR STRIP.AUTO: <=1.005 (ref 1–1.03)
UA DIPSTICK W REFLEX MICRO PNL UR: YES
URN SPEC COLLECT METH UR: ABNORMAL
UROBILINOGEN UR STRIP-ACNC: 0.2 E.U./DL
WBC #/AREA URNS HPF: ABNORMAL /HPF (ref 0–5)

## 2023-09-18 PROCEDURE — 81001 URINALYSIS AUTO W/SCOPE: CPT

## 2023-09-18 NOTE — RESULT ENCOUNTER NOTE
UA is not clearly showing a bacterial infection like before. She does have some white blood cells and a small amount of bacteria. Has she been drinking a lot of water? Remind me- what other symptoms has she been having?

## 2023-09-19 RX ORDER — AMOXICILLIN 250 MG/5ML
875 POWDER, FOR SUSPENSION ORAL 2 TIMES DAILY
Qty: 175 ML | Refills: 0 | Status: SHIPPED | OUTPATIENT
Start: 2023-09-19 | End: 2023-09-24

## 2023-09-19 NOTE — RESULT ENCOUNTER NOTE
Pain in what location or with what? No clear indication of infection as Shaji Penn had mentioned earlier.

## 2023-09-19 NOTE — RESULT ENCOUNTER NOTE
I sent Amoxicillin LIQUID over to Shazia over for her to take twice a day for 5 days. Let me know if symptoms do not improve please. Call me if script is wrong.

## 2023-09-25 ENCOUNTER — HOSPITAL ENCOUNTER (OUTPATIENT)
Age: 58
Discharge: HOME | End: 2023-09-25
Payer: MEDICARE

## 2023-09-25 DIAGNOSIS — Z78.0 ENCOUNTER FOR OSTEOPOROSIS SCREENING IN ASYMPTOMATIC POSTMENOPAUSAL PATIENT: ICD-10-CM

## 2023-09-25 DIAGNOSIS — E78.5 HYPERLIPIDEMIA, UNSPECIFIED HYPERLIPIDEMIA TYPE: ICD-10-CM

## 2023-09-25 DIAGNOSIS — F20.9 SCHIZOPHRENIA, UNSPECIFIED TYPE (HCC): ICD-10-CM

## 2023-09-25 DIAGNOSIS — E03.9 HYPOTHYROIDISM, UNSPECIFIED TYPE: ICD-10-CM

## 2023-09-25 DIAGNOSIS — E55.9 VITAMIN D DEFICIENCY: ICD-10-CM

## 2023-09-25 DIAGNOSIS — F79 INTELLECTUAL DISABILITY: ICD-10-CM

## 2023-09-25 DIAGNOSIS — Z13.820 ENCOUNTER FOR OSTEOPOROSIS SCREENING IN ASYMPTOMATIC POSTMENOPAUSAL PATIENT: ICD-10-CM

## 2023-09-25 LAB
25(OH)D3 SERPL-MCNC: 93.7 NG/ML
ALBUMIN SERPL-MCNC: 4.4 G/DL (ref 3.4–5)
ALBUMIN/GLOB SERPL: 1.3 {RATIO} (ref 1.1–2.2)
ALP SERPL-CCNC: 90 U/L (ref 40–129)
ALT SERPL-CCNC: 9 U/L (ref 10–40)
ANION GAP SERPL CALCULATED.3IONS-SCNC: 11 MMOL/L (ref 3–16)
AST SERPL-CCNC: 10 U/L (ref 15–37)
BASOPHILS # BLD: 0 K/UL (ref 0–0.2)
BASOPHILS NFR BLD: 0.7 %
BILIRUB SERPL-MCNC: 0.3 MG/DL (ref 0–1)
BUN SERPL-MCNC: 8 MG/DL (ref 7–20)
CALCIUM SERPL-MCNC: 10.1 MG/DL (ref 8.3–10.6)
CHLORIDE SERPL-SCNC: 102 MMOL/L (ref 99–110)
CHOLEST SERPL-MCNC: 216 MG/DL (ref 0–199)
CO2 SERPL-SCNC: 28 MMOL/L (ref 21–32)
CREAT SERPL-MCNC: <0.5 MG/DL (ref 0.6–1.2)
DEPRECATED RDW RBC AUTO: 13.3 % (ref 12.4–15.4)
EOSINOPHIL # BLD: 0 K/UL (ref 0–0.6)
EOSINOPHIL NFR BLD: 0.9 %
FOLATE SERPL-MCNC: 13.21 NG/ML (ref 4.78–24.2)
GFR SERPLBLD CREATININE-BSD FMLA CKD-EPI: >60 ML/MIN/{1.73_M2}
GLUCOSE SERPL-MCNC: 93 MG/DL (ref 70–99)
HCT VFR BLD AUTO: 39.8 % (ref 36–48)
HDLC SERPL-MCNC: 70 MG/DL (ref 40–60)
HGB BLD-MCNC: 13.4 G/DL (ref 12–16)
LDLC SERPL CALC-MCNC: 123 MG/DL
LYMPHOCYTES # BLD: 2.1 K/UL (ref 1–5.1)
LYMPHOCYTES NFR BLD: 44.3 %
MCH RBC QN AUTO: 29 PG (ref 26–34)
MCHC RBC AUTO-ENTMCNC: 33.6 G/DL (ref 31–36)
MCV RBC AUTO: 86.2 FL (ref 80–100)
MONOCYTES # BLD: 0.3 K/UL (ref 0–1.3)
MONOCYTES NFR BLD: 6.5 %
NEUTROPHILS # BLD: 2.3 K/UL (ref 1.7–7.7)
NEUTROPHILS NFR BLD: 47.6 %
PLATELET # BLD AUTO: 245 K/UL (ref 135–450)
PMV BLD AUTO: 8.3 FL (ref 5–10.5)
POTASSIUM SERPL-SCNC: 4.1 MMOL/L (ref 3.5–5.1)
PROT SERPL-MCNC: 7.7 G/DL (ref 6.4–8.2)
RBC # BLD AUTO: 4.62 M/UL (ref 4–5.2)
SODIUM SERPL-SCNC: 141 MMOL/L (ref 136–145)
TRIGL SERPL-MCNC: 116 MG/DL (ref 0–150)
TSH SERPL DL<=0.005 MIU/L-ACNC: 1.62 UIU/ML (ref 0.27–4.2)
VIT B12 SERPL-MCNC: 851 PG/ML (ref 211–911)
VLDLC SERPL CALC-MCNC: 23 MG/DL
WBC # BLD AUTO: 4.8 K/UL (ref 4–11)

## 2023-09-25 PROCEDURE — 82607 VITAMIN B-12: CPT

## 2023-09-25 PROCEDURE — 82306 VITAMIN D 25 HYDROXY: CPT

## 2023-09-25 PROCEDURE — 80053 COMPREHEN METABOLIC PANEL: CPT

## 2023-09-25 PROCEDURE — 84443 ASSAY THYROID STIM HORMONE: CPT

## 2023-09-25 PROCEDURE — 36415 COLL VENOUS BLD VENIPUNCTURE: CPT

## 2023-09-25 PROCEDURE — 85025 COMPLETE CBC W/AUTO DIFF WBC: CPT

## 2023-09-25 PROCEDURE — 80061 LIPID PANEL: CPT

## 2023-09-25 PROCEDURE — 80165 DIPROPYLACETIC ACID FREE: CPT

## 2023-09-25 PROCEDURE — 80164 ASSAY DIPROPYLACETIC ACD TOT: CPT

## 2023-09-25 PROCEDURE — 82746 ASSAY OF FOLIC ACID SERUM: CPT

## 2023-09-27 NOTE — RESULT ENCOUNTER NOTE
No anemia/ infection. Normal kidney/ liver function. Cholesterol mildly elevated- will watch. No need for medications. Normal thyroid function. Normal Vitamin D. Normal B12/ Folate.

## 2023-09-28 LAB
VALPROATE FREE MFR SERPL: 11.1 % (ref 5–18.4)
VALPROATE FREE SERPL-MCNC: 4.2 UG/ML (ref 7–23)
VALPROATE SERPL-MCNC: 38 UG/ML (ref 50–125)
VALPROIC DATE LAST DOSE: ABNORMAL
VALPROIC DOSE AMOUNT: ABNORMAL
VALPROIC TIME LAST DOSE: ABNORMAL

## 2023-10-09 ENCOUNTER — OFFICE VISIT (OUTPATIENT)
Dept: FAMILY MEDICINE CLINIC | Age: 58
End: 2023-10-09
Payer: MEDICARE

## 2023-10-09 VITALS — DIASTOLIC BLOOD PRESSURE: 81 MMHG | SYSTOLIC BLOOD PRESSURE: 121 MMHG | BODY MASS INDEX: 28.71 KG/M2 | WEIGHT: 147 LBS

## 2023-10-09 DIAGNOSIS — F79 INTELLECTUAL DISABILITY: ICD-10-CM

## 2023-10-09 DIAGNOSIS — F42.8 OTHER OBSESSIVE-COMPULSIVE DISORDERS: Primary | ICD-10-CM

## 2023-10-09 DIAGNOSIS — F20.9 SCHIZOPHRENIA, UNSPECIFIED TYPE (HCC): ICD-10-CM

## 2023-10-09 PROCEDURE — G8419 CALC BMI OUT NRM PARAM NOF/U: HCPCS | Performed by: NURSE PRACTITIONER

## 2023-10-09 PROCEDURE — 99213 OFFICE O/P EST LOW 20 MIN: CPT | Performed by: NURSE PRACTITIONER

## 2023-10-09 PROCEDURE — G8484 FLU IMMUNIZE NO ADMIN: HCPCS | Performed by: NURSE PRACTITIONER

## 2023-10-09 PROCEDURE — G8427 DOCREV CUR MEDS BY ELIG CLIN: HCPCS | Performed by: NURSE PRACTITIONER

## 2023-10-09 PROCEDURE — 3017F COLORECTAL CA SCREEN DOC REV: CPT | Performed by: NURSE PRACTITIONER

## 2023-10-09 PROCEDURE — 1036F TOBACCO NON-USER: CPT | Performed by: NURSE PRACTITIONER

## 2023-10-09 NOTE — PROGRESS NOTES
present. No swelling, deformity, effusion, erythema, ecchymosis, lacerations or bony tenderness. Normal range of motion. No tenderness. No LCL laxity, MCL laxity, ACL laxity or PCL laxity. Normal alignment, normal meniscus and normal patellar mobility. Normal pulse. Instability Tests: Anterior drawer test negative. Posterior drawer test negative. Anterior Lachman test negative. Medial Ramila test negative and lateral Ramila test negative. Right lower leg: No edema. Left lower leg: No edema. Right ankle: Normal.      Left ankle: Normal.   Lymphadenopathy:      Cervical: No cervical adenopathy. Skin:     General: Skin is warm and dry. Capillary Refill: Capillary refill takes less than 2 seconds. Coloration: Skin is not jaundiced. Findings: No rash. Neurological:      General: No focal deficit present. Mental Status: She is alert. Mental status is at baseline. Cranial Nerves: No cranial nerve deficit. Sensory: No sensory deficit. Motor: No weakness. Coordination: Coordination normal.      Gait: Gait normal.      Deep Tendon Reflexes: Reflexes normal.      Comments: Oriented to person and self, which is her baseline   Psychiatric:         Attention and Perception: Attention and perception normal.         Mood and Affect: Mood and affect normal.         Speech: Speech normal.         Behavior: Behavior is slowed. Behavior is cooperative. Thought Content: Thought content is not paranoid or delusional.         Cognition and Memory: Cognition is impaired. Judgment: Judgment is not impulsive or inappropriate. Comments: No skin picking, pulling of her hair or racking noted during the exam.    She is at her baseline level of neurodevelopmental status           Patient's questions answered and concerns addressed. Patient agrees to plan of care.         Electronically signed by TAHIRA Wayne CNP on 10/15/2023 at 9:15 PM

## 2023-10-10 ENCOUNTER — TELEPHONE (OUTPATIENT)
Dept: FAMILY MEDICINE CLINIC | Age: 58
End: 2023-10-10

## 2023-10-10 DIAGNOSIS — H10.32 ACUTE BACTERIAL CONJUNCTIVITIS OF LEFT EYE: Primary | ICD-10-CM

## 2023-10-10 RX ORDER — POLYMYXIN B SULFATE AND TRIMETHOPRIM 1; 10000 MG/ML; [USP'U]/ML
1 SOLUTION OPHTHALMIC EVERY 4 HOURS
Qty: 3 ML | Refills: 0 | Status: SHIPPED | OUTPATIENT
Start: 2023-10-10 | End: 2023-10-20

## 2023-10-15 ASSESSMENT — ENCOUNTER SYMPTOMS
COUGH: 0
BLOOD IN STOOL: 0
DIARRHEA: 0
SHORTNESS OF BREATH: 0
CHEST TIGHTNESS: 0
CONSTIPATION: 0

## 2023-10-26 RX ORDER — DIVALPROEX SODIUM 250 MG/1
TABLET, DELAYED RELEASE ORAL
Qty: 60 TABLET | Refills: 5 | Status: SHIPPED | OUTPATIENT
Start: 2023-10-26

## 2023-10-26 NOTE — TELEPHONE ENCOUNTER
Refill Request     CONFIRM preferrred pharmacy with the patient. If Mail Order Rx - Pend for 90 day refill. Last Seen: Last Seen Department: 10/9/2023  Last Seen by PCP: 10/9/2023    Last Written: 3-    If no future appointment scheduled, route STAFF MESSAGE with patient name to the [facility] for scheduling. Next Appointment:   Future Appointments   Date Time Provider [facility] [address]   11/29/2023  9:40 AM Otelia Juba, APRN - CNP [facility] [place] - [place]   1/8/2024  9:20 AM Otelia Juba, APRN - CNP [facility] [place] - [place]       Message sent to [address] [place] [address] to schedule appt with patient? N/A      Requested Prescriptions     Pending Prescriptions Disp Refills    divalproex (DEPAKOTE) 250 MG DR tablet [Pharmacy Med Name: DIVALPROEX 250MG DR] 60 tablet 5     Sig: TAKE 1 TABLET BY MOUTH 2 TIMES A DAY.  ONCE IN THE MORNING AT 6:30 AND ONCE IN THE EVENING AT 5PM.

## 2023-10-27 NOTE — TELEPHONE ENCOUNTER
Usually needs to be seen. Will make exception this time. Sent Polytrim to pharmacy. [facility]alt[facility] Counseling: I discussed with the patient the risks of ixekizumab including but not limited to immunosuppression, serious infections, worsening of inflammatory bowel disease and drug reactions.  The patient understands that monitoring is required including a PPD at baseline and must alert us or the primary physician if symptoms of infection or other concerning signs are noted.

## 2023-11-11 DIAGNOSIS — F79 INTELLECTUAL DISABILITY: ICD-10-CM

## 2023-11-11 DIAGNOSIS — F20.9 SCHIZOPHRENIA, UNSPECIFIED TYPE (HCC): ICD-10-CM

## 2023-11-11 DIAGNOSIS — F42.4 DERMATILLOMANIA IN ADULT: ICD-10-CM

## 2023-11-13 RX ORDER — SERTRALINE HYDROCHLORIDE 100 MG/1
TABLET, FILM COATED ORAL
Qty: 46.5 TABLET | Refills: 11 | Status: SHIPPED | OUTPATIENT
Start: 2023-11-13

## 2023-11-13 RX ORDER — BISMUTH SUBSALICYLATE 525 MG/15ML
SUSPENSION ORAL
Qty: 24 TABLET | Refills: 11 | Status: SHIPPED | OUTPATIENT
Start: 2023-11-13

## 2023-11-13 RX ORDER — DIVALPROEX SODIUM 250 MG/1
TABLET, DELAYED RELEASE ORAL
Qty: 62 TABLET | Refills: 11 | Status: SHIPPED | OUTPATIENT
Start: 2023-11-13

## 2023-11-13 RX ORDER — PSEUDOEPHEDRINE HCL 30 MG
TABLET ORAL
Qty: 30 TABLET | Refills: 5 | Status: SHIPPED | OUTPATIENT
Start: 2023-11-13

## 2023-11-13 RX ORDER — ERGOCALCIFEROL 1.25 MG/1
CAPSULE ORAL
Qty: 5 CAPSULE | Refills: 11 | Status: SHIPPED | OUTPATIENT
Start: 2023-11-13

## 2023-11-13 RX ORDER — ACETAMINOPHEN 325 MG/1
TABLET ORAL
Qty: 60 TABLET | Refills: 11 | Status: SHIPPED | OUTPATIENT
Start: 2023-11-13

## 2023-11-13 RX ORDER — LACTULOSE 10 G/15ML
SOLUTION ORAL
Qty: 473 ML | Refills: 11 | Status: SHIPPED | OUTPATIENT
Start: 2023-11-13

## 2023-11-13 RX ORDER — IBUPROFEN 200 MG
CAPSULE ORAL
Qty: 28 G | Refills: 11 | Status: SHIPPED | OUTPATIENT
Start: 2023-11-13

## 2023-11-13 RX ORDER — KETOCONAZOLE 20 MG/ML
SHAMPOO TOPICAL
Qty: 120 ML | Refills: 11 | Status: SHIPPED | OUTPATIENT
Start: 2023-11-13

## 2023-11-13 RX ORDER — OLANZAPINE 20 MG/1
20 TABLET ORAL NIGHTLY
Qty: 31 TABLET | Refills: 11 | Status: SHIPPED | OUTPATIENT
Start: 2023-11-13

## 2023-11-13 RX ORDER — IBUPROFEN 200 MG
TABLET ORAL
Qty: 60 TABLET | Refills: 11 | Status: SHIPPED | OUTPATIENT
Start: 2023-11-13

## 2023-11-29 ENCOUNTER — OFFICE VISIT (OUTPATIENT)
Dept: FAMILY MEDICINE CLINIC | Age: 58
End: 2023-11-29
Payer: MEDICARE

## 2023-11-29 VITALS
WEIGHT: 145 LBS | BODY MASS INDEX: 28.32 KG/M2 | SYSTOLIC BLOOD PRESSURE: 110 MMHG | DIASTOLIC BLOOD PRESSURE: 80 MMHG | TEMPERATURE: 97.8 F

## 2023-11-29 DIAGNOSIS — Z01.818 PREOP EXAMINATION: Primary | ICD-10-CM

## 2023-11-29 DIAGNOSIS — K02.9 DENTAL CARIES: ICD-10-CM

## 2023-11-29 DIAGNOSIS — F20.9 SCHIZOPHRENIA, UNSPECIFIED TYPE (HCC): ICD-10-CM

## 2023-11-29 DIAGNOSIS — F42.8 OTHER OBSESSIVE-COMPULSIVE DISORDERS: ICD-10-CM

## 2023-11-29 DIAGNOSIS — F79 INTELLECTUAL DISABILITY: ICD-10-CM

## 2023-11-29 LAB
ANION GAP SERPL CALCULATED.3IONS-SCNC: 9 MMOL/L (ref 3–16)
BASOPHILS # BLD: 0 K/UL (ref 0–0.2)
BASOPHILS NFR BLD: 0.2 %
BUN SERPL-MCNC: 9 MG/DL (ref 7–20)
CALCIUM SERPL-MCNC: 9.7 MG/DL (ref 8.3–10.6)
CHLORIDE SERPL-SCNC: 101 MMOL/L (ref 99–110)
CO2 SERPL-SCNC: 28 MMOL/L (ref 21–32)
CREAT SERPL-MCNC: 0.6 MG/DL (ref 0.6–1.2)
DEPRECATED RDW RBC AUTO: 14.4 % (ref 12.4–15.4)
EOSINOPHIL # BLD: 0 K/UL (ref 0–0.6)
EOSINOPHIL NFR BLD: 0.4 %
GFR SERPLBLD CREATININE-BSD FMLA CKD-EPI: >60 ML/MIN/{1.73_M2}
GLUCOSE SERPL-MCNC: 87 MG/DL (ref 70–99)
HCT VFR BLD AUTO: 39 % (ref 36–48)
HGB BLD-MCNC: 13.2 G/DL (ref 12–16)
LYMPHOCYTES # BLD: 2.7 K/UL (ref 1–5.1)
LYMPHOCYTES NFR BLD: 44.1 %
MCH RBC QN AUTO: 29.4 PG (ref 26–34)
MCHC RBC AUTO-ENTMCNC: 33.8 G/DL (ref 31–36)
MCV RBC AUTO: 87.2 FL (ref 80–100)
MONOCYTES # BLD: 0.4 K/UL (ref 0–1.3)
MONOCYTES NFR BLD: 5.8 %
NEUTROPHILS # BLD: 3.1 K/UL (ref 1.7–7.7)
NEUTROPHILS NFR BLD: 49.5 %
PLATELET # BLD AUTO: 225 K/UL (ref 135–450)
PMV BLD AUTO: 8.9 FL (ref 5–10.5)
POTASSIUM SERPL-SCNC: 4.2 MMOL/L (ref 3.5–5.1)
RBC # BLD AUTO: 4.48 M/UL (ref 4–5.2)
SODIUM SERPL-SCNC: 138 MMOL/L (ref 136–145)
WBC # BLD AUTO: 6.2 K/UL (ref 4–11)

## 2023-11-29 PROCEDURE — 1036F TOBACCO NON-USER: CPT | Performed by: NURSE PRACTITIONER

## 2023-11-29 PROCEDURE — 3017F COLORECTAL CA SCREEN DOC REV: CPT | Performed by: NURSE PRACTITIONER

## 2023-11-29 PROCEDURE — 99213 OFFICE O/P EST LOW 20 MIN: CPT | Performed by: NURSE PRACTITIONER

## 2023-11-29 PROCEDURE — G8484 FLU IMMUNIZE NO ADMIN: HCPCS | Performed by: NURSE PRACTITIONER

## 2023-11-29 PROCEDURE — 36415 COLL VENOUS BLD VENIPUNCTURE: CPT | Performed by: NURSE PRACTITIONER

## 2023-11-29 PROCEDURE — G8427 DOCREV CUR MEDS BY ELIG CLIN: HCPCS | Performed by: NURSE PRACTITIONER

## 2023-11-29 PROCEDURE — G8419 CALC BMI OUT NRM PARAM NOF/U: HCPCS | Performed by: NURSE PRACTITIONER

## 2023-11-29 ASSESSMENT — ENCOUNTER SYMPTOMS
SHORTNESS OF BREATH: 0
CHEST TIGHTNESS: 0
BLOOD IN STOOL: 0
COUGH: 0
DIARRHEA: 0
CONSTIPATION: 0

## 2023-11-29 NOTE — PROGRESS NOTES
Tommy Griffin  YOB: 1965    Date of Service:  11/29/2023      Wt Readings from Last 2 Encounters:   11/29/23 65.8 kg (145 lb)   10/09/23 66.7 kg (147 lb)       BP Readings from Last 3 Encounters:   11/29/23 110/80   10/09/23 121/81   09/13/23 102/68        Chief Complaint   Patient presents with    Pre-op Exam     Dental Cleaning Surgery/General Anthesia put under, Dr. Kacie Merchant, EKG(Unknown) 12/06/23       No Known Allergies    Outpatient Medications Marked as Taking for the 11/29/23 encounter (Office Visit) with TAHIRA Thurman - CNP   Medication Sig Dispense Refill    ketoconazole (NIZORAL) 2 % shampoo APPLY TOPICALLY TWICE WEEKLY FOR DANDRUFF 120 mL 11    sertraline (ZOLOFT) 100 MG tablet TAKE 1 & 1/2 TABLETS (150MG) BY MOUTH ONCE EVERY DAY AT AT 4PM FOR DEPRESSION/ SKIN PICKING 46.5 tablet 11    OLANZapine (ZYPREXA) 20 MG tablet TAKE 1 TABLET BY MOUTH NIGHTLY 31 tablet 11    divalproex (DEPAKOTE) 250 MG DR tablet TAKE 1 TABLET BY MOUTH TWICE DAILY IN THE MORNING AT 6:30AM AND EVENING AT AT 5PM 62 tablet 11    acetaminophen (TYLENOL) 325 MG tablet TAKE 2 TABLETS (650MG) BY MOUTH EVERY 4 HOURS AS NEEDED FOR PAIN, HEADACHE OR ELEVATED TEMPERATURE 60 tablet 11    ibuprofen (ADVIL;MOTRIN) 200 MG tablet TAKE 2 TABLETS (400MG) BY MOUTH EVERY 4 HOURS AS NEEDED FOR PAIN 60 tablet 11    pseudoephedrine (SUDAFED) 30 MG tablet TAKE 1 TABLET BY MOUTH EVERY 4 HOURS AS NEEDED FOR CONGESTION 30 tablet 5    lactulose (CHRONULAC) 10 GM/15ML solution TAKE 1 TABLESPOONFUL BY MOUTH THREE TIMES DAILY AS NEEDED FOR CONSTIPATION 473 mL 11    ANTI-DIARRHEAL 2 MG tablet TAKE 2 TABLETS (4MG) BY MOUTH . STAT THEN 1 TAB FOUR TIMES DAILY AS NEEDED FOR DIARRHEA 24 tablet 11    neomycin-bacitracin-polymyxin (NEOSPORIN) 3.5-400-5000 OINT ointment APPLY TOPICALLY TO CUTS, ABRASIONS, SCRAPES OR SCRATCHES FOUR TIMES DAILY AS NEEDED 28 g 11    vitamin D (ERGOCALCIFEROL) 1.25 MG (48251 UT) CAPS capsule 1CQW ON WEDNESDAYS AT

## 2023-11-29 NOTE — PATIENT INSTRUCTIONS
Hold all medications morning of surgery, give after surgery. Hold all vitamins for 2 weeks then restart after surgery. Recommend eye doctor evaluation for skin damage to nose from her glasses.

## 2024-01-07 ASSESSMENT — ENCOUNTER SYMPTOMS
CONSTIPATION: 0
COUGH: 0
SHORTNESS OF BREATH: 0
BLOOD IN STOOL: 0
DIARRHEA: 0
CHEST TIGHTNESS: 0

## 2024-01-08 ENCOUNTER — OFFICE VISIT (OUTPATIENT)
Dept: FAMILY MEDICINE CLINIC | Age: 59
End: 2024-01-08

## 2024-01-08 VITALS
WEIGHT: 145 LBS | TEMPERATURE: 97.6 F | BODY MASS INDEX: 28.32 KG/M2 | DIASTOLIC BLOOD PRESSURE: 73 MMHG | SYSTOLIC BLOOD PRESSURE: 120 MMHG | HEART RATE: 76 BPM

## 2024-01-08 DIAGNOSIS — F20.9 SCHIZOPHRENIA, UNSPECIFIED TYPE (HCC): Primary | ICD-10-CM

## 2024-01-08 DIAGNOSIS — F42.8 OTHER OBSESSIVE-COMPULSIVE DISORDERS: ICD-10-CM

## 2024-01-08 ASSESSMENT — PATIENT HEALTH QUESTIONNAIRE - PHQ9
SUM OF ALL RESPONSES TO PHQ QUESTIONS 1-9: 1
1. LITTLE INTEREST OR PLEASURE IN DOING THINGS: 0
2. FEELING DOWN, DEPRESSED OR HOPELESS: 1
SUM OF ALL RESPONSES TO PHQ QUESTIONS 1-9: 1
SUM OF ALL RESPONSES TO PHQ QUESTIONS 1-9: 1
SUM OF ALL RESPONSES TO PHQ9 QUESTIONS 1 & 2: 1
SUM OF ALL RESPONSES TO PHQ QUESTIONS 1-9: 1

## 2024-01-08 ASSESSMENT — LIFESTYLE VARIABLES: HOW OFTEN DO YOU HAVE A DRINK CONTAINING ALCOHOL: NEVER

## 2024-01-08 NOTE — PROGRESS NOTES
1/8/2024    Chief Complaint   Patient presents with    obsessive-compulsive disorders    Schizophrenia, unspecified type     Letter     [facility] states she needs a letter stating Charlene needs assistance bathing and toilet ing     Medicare AWV       Charlene Moralez is a 58 y.o. female, presents today for:      ASSESSMENT/PLAN:  1. Schizophrenia, unspecified type (HCC)  Letter written today due to needing assistance with personal bathing.    Stable today, behaviors improved after increased dose of Zyprexa last OV.    Continue Olanzapine 20, Sertraline 150, Depakote 250 mg BID  Due for annual surveillance labs next OV    2. Other obsessive-compulsive disorders  Picking behavior has improved with increased dose of Sertraline    Return in about 6 weeks (around 2/19/2024) for awv.    Presenting today for follow up on schizophrenia.    Picking behaviors better.  Care provider does not feel any further dose changes are needed for now.  Notes Charlene is unable to clean her perineal region without assistance from care staff.  Shizophrenia-   Continues to take Sertraline/ Zyprexa daily.  Not currently following with Psychology.      Lab Results   Component Value Date     11/29/2023    K 4.2 11/29/2023     11/29/2023    CO2 28 11/29/2023    BUN 9 11/29/2023    CREATININE 0.6 11/29/2023    GLUCOSE 87 11/29/2023    CALCIUM 9.7 11/29/2023    PROT 7.7 09/25/2023    LABALBU 4.4 09/25/2023    BILITOT 0.3 09/25/2023    ALKPHOS 90 09/25/2023    AST 10 (L) 09/25/2023    ALT 9 (L) 09/25/2023    LABGLOM >60 11/29/2023    GFRAA >60 09/26/2022    AGRATIO 1.3 09/25/2023    GLOB 2.6 10/02/2020         Review of Systems   Constitutional: Negative.    Respiratory:  Negative for cough, chest tightness and shortness of breath.    Cardiovascular: Negative.    Gastrointestinal:  Negative for blood in stool, constipation and diarrhea.   Skin: Negative.    Neurological:  Negative for dizziness, tremors, light-headedness and headaches.

## 2024-02-08 ENCOUNTER — TELEPHONE (OUTPATIENT)
Dept: FAMILY MEDICINE CLINIC | Age: 59
End: 2024-02-08

## 2024-02-08 NOTE — TELEPHONE ENCOUNTER
Last night when patient showered they noticed 13 sores all over. they don't look infected but she scratches and picks at them and they bleed a little bit. Asking if there is ointment to put on them or should she make an appointment to have them looked at.

## 2024-02-14 ENCOUNTER — OFFICE VISIT (OUTPATIENT)
Dept: FAMILY MEDICINE CLINIC | Age: 59
End: 2024-02-14
Payer: MEDICARE

## 2024-02-14 VITALS
HEART RATE: 90 BPM | SYSTOLIC BLOOD PRESSURE: 114 MMHG | WEIGHT: 147 LBS | OXYGEN SATURATION: 98 % | DIASTOLIC BLOOD PRESSURE: 68 MMHG | TEMPERATURE: 97.4 F | BODY MASS INDEX: 28.71 KG/M2

## 2024-02-14 DIAGNOSIS — F42.4 COMPULSIVE SKIN PICKING: Primary | ICD-10-CM

## 2024-02-14 DIAGNOSIS — F20.9 SCHIZOPHRENIA, UNSPECIFIED TYPE (HCC): ICD-10-CM

## 2024-02-14 DIAGNOSIS — F42.8 OTHER OBSESSIVE-COMPULSIVE DISORDERS: ICD-10-CM

## 2024-02-14 PROCEDURE — G8419 CALC BMI OUT NRM PARAM NOF/U: HCPCS | Performed by: NURSE PRACTITIONER

## 2024-02-14 PROCEDURE — 1036F TOBACCO NON-USER: CPT | Performed by: NURSE PRACTITIONER

## 2024-02-14 PROCEDURE — G8484 FLU IMMUNIZE NO ADMIN: HCPCS | Performed by: NURSE PRACTITIONER

## 2024-02-14 PROCEDURE — 3017F COLORECTAL CA SCREEN DOC REV: CPT | Performed by: NURSE PRACTITIONER

## 2024-02-14 PROCEDURE — 99213 OFFICE O/P EST LOW 20 MIN: CPT | Performed by: NURSE PRACTITIONER

## 2024-02-14 PROCEDURE — G8427 DOCREV CUR MEDS BY ELIG CLIN: HCPCS | Performed by: NURSE PRACTITIONER

## 2024-02-14 RX ORDER — CETIRIZINE HYDROCHLORIDE 10 MG/1
10 TABLET ORAL DAILY
Qty: 30 TABLET | Refills: 0 | Status: SHIPPED | OUTPATIENT
Start: 2024-02-14 | End: 2024-03-15

## 2024-02-14 RX ORDER — AMMONIUM LACTATE 12 G/100G
CREAM TOPICAL 2 TIMES DAILY
Qty: 385 G | Refills: 4 | Status: SHIPPED | OUTPATIENT
Start: 2024-02-14 | End: 2024-02-14

## 2024-02-14 RX ORDER — AMMONIUM LACTATE 12 G/100G
CREAM TOPICAL 2 TIMES DAILY
Qty: 385 G | Refills: 4 | Status: SHIPPED | OUTPATIENT
Start: 2024-02-14

## 2024-02-14 NOTE — PATIENT INSTRUCTIONS
Absolutely no longer nails.  Nails need to be cut or filed weekly.  This needs to be documented for everyone  Showers: Need to be moderate temperature.  Absolutely no hot showers due to skin picking (releases histamine which will make her pick more).    Use lotion on skin twice a day.  If she is picking after Day Program then try to put it on when she gets home.    No wound care needed for now but try to redirect if possible.   May need to have Charlene wear gloves at night to prevent picking  New Meds: Zyrtec (help with itching), Lac Hydrin (for

## 2024-02-14 NOTE — PROGRESS NOTES
sertraline (ZOLOFT) 100 MG tablet TAKE 1 & 1/2 TABLETS (150MG) BY MOUTH ONCE EVERY DAY AT AT 4PM FOR DEPRESSION/ SKIN PICKING 46.5 tablet 11    OLANZapine (ZYPREXA) 20 MG tablet TAKE 1 TABLET BY MOUTH NIGHTLY 31 tablet 11    divalproex (DEPAKOTE) 250 MG DR tablet TAKE 1 TABLET BY MOUTH TWICE DAILY IN THE MORNING AT 6:30AM AND EVENING AT AT 5PM 62 tablet 11    acetaminophen (TYLENOL) 325 MG tablet TAKE 2 TABLETS (650MG) BY MOUTH EVERY 4 HOURS AS NEEDED FOR PAIN, HEADACHE OR ELEVATED TEMPERATURE 60 tablet 11    ibuprofen (ADVIL;MOTRIN) 200 MG tablet TAKE 2 TABLETS (400MG) BY MOUTH EVERY 4 HOURS AS NEEDED FOR PAIN 60 tablet 11    pseudoephedrine (SUDAFED) 30 MG tablet TAKE 1 TABLET BY MOUTH EVERY 4 HOURS AS NEEDED FOR CONGESTION 30 tablet 5    lactulose (CHRONULAC) 10 GM/15ML solution TAKE 1 TABLESPOONFUL BY MOUTH THREE TIMES DAILY AS NEEDED FOR CONSTIPATION 473 mL 11    ANTI-DIARRHEAL 2 MG tablet TAKE 2 TABLETS (4MG) BY MOUTH .STAT THEN 1 TAB FOUR TIMES DAILY AS NEEDED FOR DIARRHEA 24 tablet 11    neomycin-bacitracin-polymyxin (NEOSPORIN) 3.5-400-5000 OINT ointment APPLY TOPICALLY TO CUTS, ABRASIONS, SCRAPES OR SCRATCHES FOUR TIMES DAILY AS NEEDED 28 g 11    vitamin D (ERGOCALCIFEROL) 1.25 MG (55133 UT) CAPS capsule 1CQW ON WEDNESDAYS AT 630AM 5 capsule 11    loperamide (IMODIUM) 2 MG capsule TAKE 2 TABLETS STAT THEN 1 TABLET 4 TIMES DAILY AS NEEDED FOR DIARRHEA 12 capsule 0    magnesium hydroxide (MILK OF MAGNESIA) 400 MG/5ML suspension TAKE 30ML BY MOUTH AT BEDTIME AS NEEDED  IF NO BM FOR 2 DAYS (CONSTIPATION) 355 mL 0    Disposable Gloves MISC Use as directed 1 each 11    Incontinence Supply Disposable (DEPEND FIT-FLEX WOMENS/SMALL) MISC Use as needed 200 each 11    Incontinence Supply Disposable (COTTONELLE MOIST WIPES) MISC Use as needed 1 each 11    SUNSCREEN SPF50 LOTN [id number] TOPICALLY 3 TIMES DAILY AS NEEDED FOR INSECT REPELLANT 2 Bottle 5    Diethyltoluamide (JOHNSON INSECT REPELLENT) 30 % AERO Apply 1 each

## 2024-03-04 DIAGNOSIS — F42.4 COMPULSIVE SKIN PICKING: ICD-10-CM

## 2024-03-05 RX ORDER — CETIRIZINE HYDROCHLORIDE 10 MG/1
TABLET ORAL
Qty: 30 TABLET | Refills: 11 | Status: SHIPPED | OUTPATIENT
Start: 2024-03-05

## 2024-03-06 ENCOUNTER — OFFICE VISIT (OUTPATIENT)
Dept: FAMILY MEDICINE CLINIC | Age: 59
End: 2024-03-06
Payer: MEDICARE

## 2024-03-06 VITALS
WEIGHT: 144 LBS | TEMPERATURE: 97.7 F | BODY MASS INDEX: 28.12 KG/M2 | SYSTOLIC BLOOD PRESSURE: 108 MMHG | DIASTOLIC BLOOD PRESSURE: 74 MMHG | OXYGEN SATURATION: 98 % | HEART RATE: 71 BPM

## 2024-03-06 DIAGNOSIS — R39.81 FUNCTIONAL INCONTINENCE: ICD-10-CM

## 2024-03-06 DIAGNOSIS — E03.9 HYPOTHYROIDISM, UNSPECIFIED TYPE: ICD-10-CM

## 2024-03-06 DIAGNOSIS — F20.9 SCHIZOPHRENIA, UNSPECIFIED TYPE (HCC): ICD-10-CM

## 2024-03-06 DIAGNOSIS — K59.04 CHRONIC IDIOPATHIC CONSTIPATION: ICD-10-CM

## 2024-03-06 DIAGNOSIS — F42.8 OTHER OBSESSIVE-COMPULSIVE DISORDERS: ICD-10-CM

## 2024-03-06 DIAGNOSIS — E78.5 HYPERLIPIDEMIA, UNSPECIFIED HYPERLIPIDEMIA TYPE: ICD-10-CM

## 2024-03-06 DIAGNOSIS — F42.4 COMPULSIVE SKIN PICKING: ICD-10-CM

## 2024-03-06 DIAGNOSIS — Z00.00 MEDICARE ANNUAL WELLNESS VISIT, SUBSEQUENT: Primary | ICD-10-CM

## 2024-03-06 PROCEDURE — G8484 FLU IMMUNIZE NO ADMIN: HCPCS | Performed by: NURSE PRACTITIONER

## 2024-03-06 PROCEDURE — G0439 PPPS, SUBSEQ VISIT: HCPCS | Performed by: NURSE PRACTITIONER

## 2024-03-06 PROCEDURE — 3017F COLORECTAL CA SCREEN DOC REV: CPT | Performed by: NURSE PRACTITIONER

## 2024-03-06 SDOH — ECONOMIC STABILITY: INCOME INSECURITY: HOW HARD IS IT FOR YOU TO PAY FOR THE VERY BASICS LIKE FOOD, HOUSING, MEDICAL CARE, AND HEATING?: NOT HARD AT ALL

## 2024-03-06 SDOH — ECONOMIC STABILITY: FOOD INSECURITY: WITHIN THE PAST 12 MONTHS, YOU WORRIED THAT YOUR FOOD WOULD RUN OUT BEFORE YOU GOT MONEY TO BUY MORE.: NEVER TRUE

## 2024-03-06 SDOH — ECONOMIC STABILITY: FOOD INSECURITY: WITHIN THE PAST 12 MONTHS, THE FOOD YOU BOUGHT JUST DIDN'T LAST AND YOU DIDN'T HAVE MONEY TO GET MORE.: NEVER TRUE

## 2024-03-06 ASSESSMENT — PATIENT HEALTH QUESTIONNAIRE - PHQ9
1. LITTLE INTEREST OR PLEASURE IN DOING THINGS: 0
SUM OF ALL RESPONSES TO PHQ QUESTIONS 1-9: 0
SUM OF ALL RESPONSES TO PHQ9 QUESTIONS 1 & 2: 0
SUM OF ALL RESPONSES TO PHQ QUESTIONS 1-9: 0
2. FEELING DOWN, DEPRESSED OR HOPELESS: 0

## 2024-03-06 NOTE — PROGRESS NOTES
APPLY TOPICALLY TO CUTS, ABRASIONS, SCRAPES OR SCRATCHES FOUR TIMES DAILY AS NEEDED 28 g 11    vitamin D (ERGOCALCIFEROL) 1.25 MG (04541 UT) CAPS capsule 1CQW ON WEDNESDAYS AT 630AM 5 capsule 11    loperamide (IMODIUM) 2 MG capsule TAKE 2 TABLETS STAT THEN 1 TABLET 4 TIMES DAILY AS NEEDED FOR DIARRHEA 12 capsule 0    magnesium hydroxide (MILK OF MAGNESIA) 400 MG/5ML suspension TAKE 30ML BY MOUTH AT BEDTIME AS NEEDED  IF NO BM FOR 2 DAYS (CONSTIPATION) 355 mL 0    Disposable Gloves MISC Use as directed 1 each 11    Incontinence Supply Disposable (DEPEND FIT-FLEX WOMENS/SMALL) MISC Use as needed 200 each 11    Incontinence Supply Disposable (COTTONELLE MOIST WIPES) MISC Use as needed 1 each 11    SUNSCREEN SPF50 LOTN [id number] TOPICALLY 3 TIMES DAILY AS NEEDED FOR INSECT REPELLANT 2 Bottle 5    Diethyltoluamide (JOHNSON INSECT REPELLENT) 30 % AERO Apply 1 each topically three times daily 1 Can 5    Misc. Devices (WALL GRAB BAR) MISC Dispense 1 grab bar to be used in shower to prevent falls 1 each 0    ANTACID MAXIMUM STRENGTH 400-400-40 MG/5ML SUSP TAKE 30ML BY MOUTH EVERY 4 HOURS AS NEEDED FOR UPSET STOMACH 355 mL 0    Calamine 8-8 % LOTN lotion APPLY TOPICALLY TO AFFECTED AREA 3 TIMES DAILY AS NEEDED FOR MILD SKINIRRITATION OR RASHES 177 mL 0    CHLORASEPTIC 6-10 MG LOZG lozenge USE 1 LOZENGE BY MOUTH EVERY 2 HOURS AS NEEDED FOR SORE THROAT 30 lozenge 3    ANTACID 500 MG chewable tablet CHEW 2 TABLETS BY MOUTH 6 TIMES DAILY AS NEEDED FOR HEARTBURN 360 tablet 0    Ca Carbonate-Mag Hydroxide (ANTACID) 550-110 MG CHEW Take 2 tablets by mouth 6 times daily 360 tablet 5    Disposable Gloves (VINYL GLOVES ONE SIZE) MISC 2 each by Does not apply route 4 times daily 200 each 5    Disposable Gloves (LATEX GLOVES) MISC Use as directed 200 each 5     No current facility-administered medications on file prior to visit.     No Known Allergies  Past Medical History:   Diagnosis Date    Constipation     Hyperlipidemia     Mental

## 2024-03-12 NOTE — PATIENT INSTRUCTIONS
and call your doctor if you are having problems. It's also a good idea to know your test results and keep a list of the medicines you take.  Current as of: October 24, 2023               Content Version: 14.0  © 2006-2024 [employer].   Care instructions adapted under license by [employer]. If you have questions about a medical condition or this instruction, always ask your healthcare professional. [employer] disclaims any warranty or liability for your use of this information.           A Healthy Heart: Care Instructions  Overview     Coronary artery disease, also called heart disease, occurs when a substance called plaque builds up in the vessels that supply oxygen-rich blood to your heart muscle. This can narrow the blood vessels and reduce blood flow. A heart attack happens when blood flow is completely blocked. A high-fat diet, smoking, and other factors increase the risk of heart disease.  Your doctor has found that you have a chance of having heart disease. A heart-healthy lifestyle can help keep your heart healthy and prevent heart disease. This lifestyle includes eating healthy, being active, staying at a weight that's healthy for you, and not smoking or using tobacco. It also includes taking medicines as directed, managing other health conditions, and trying to get a healthy amount of sleep.  Follow-up care is a key part of your treatment and safety. Be sure to make and go to all appointments, and call your doctor if you are having problems. It's also a good idea to know your test results and keep a list of the medicines you take.  How can you care for yourself at home?  Diet    Use less salt when you cook and eat. This helps lower your blood pressure. Taste food before salting. Add only a little salt when you think you need it. With time, your taste buds will adjust to less salt.     Eat fewer snack items, fast foods, canned soups, and other high-salt, high-fat, processed

## 2024-04-15 ENCOUNTER — HOSPITAL ENCOUNTER (OUTPATIENT)
Dept: MAMMOGRAPHY | Age: 59
Discharge: HOME | End: 2024-04-15
Payer: MEDICARE

## 2024-04-15 ENCOUNTER — ANCILLARY ORDERS (OUTPATIENT)
Dept: MAMMOGRAPHY | Age: 59
End: 2024-04-15

## 2024-04-15 DIAGNOSIS — Z12.39 SCREENING BREAST EXAMINATION: ICD-10-CM

## 2024-04-15 DIAGNOSIS — Z12.39 SCREENING BREAST EXAMINATION: Primary | ICD-10-CM

## 2024-04-15 DIAGNOSIS — R92.333 HETEROGENEOUSLY DENSE TISSUE OF BOTH BREASTS ON MAMMOGRAPHY: ICD-10-CM

## 2024-04-15 PROCEDURE — 77063 BREAST TOMOSYNTHESIS BI: CPT

## 2024-04-16 NOTE — RESULT ENCOUNTER NOTE
Negative Breast Cancer screening.  Breasts are dense which can obstruct masses.  Can consider doing Breast ultrasound

## 2024-06-03 ENCOUNTER — OFFICE VISIT (OUTPATIENT)
Dept: FAMILY MEDICINE CLINIC | Age: 59
End: 2024-06-03
Payer: MEDICARE

## 2024-06-03 VITALS
WEIGHT: 147 LBS | SYSTOLIC BLOOD PRESSURE: 110 MMHG | DIASTOLIC BLOOD PRESSURE: 75 MMHG | OXYGEN SATURATION: 98 % | TEMPERATURE: 97.8 F | BODY MASS INDEX: 28.71 KG/M2 | HEART RATE: 97 BPM

## 2024-06-03 DIAGNOSIS — F42.4 COMPULSIVE SKIN PICKING: Primary | ICD-10-CM

## 2024-06-03 DIAGNOSIS — F42.8 OTHER OBSESSIVE-COMPULSIVE DISORDERS: ICD-10-CM

## 2024-06-03 PROCEDURE — 1036F TOBACCO NON-USER: CPT | Performed by: NURSE PRACTITIONER

## 2024-06-03 PROCEDURE — 3017F COLORECTAL CA SCREEN DOC REV: CPT | Performed by: NURSE PRACTITIONER

## 2024-06-03 PROCEDURE — 99213 OFFICE O/P EST LOW 20 MIN: CPT | Performed by: NURSE PRACTITIONER

## 2024-06-03 PROCEDURE — G8427 DOCREV CUR MEDS BY ELIG CLIN: HCPCS | Performed by: NURSE PRACTITIONER

## 2024-06-03 PROCEDURE — G8419 CALC BMI OUT NRM PARAM NOF/U: HCPCS | Performed by: NURSE PRACTITIONER

## 2024-06-03 ASSESSMENT — ENCOUNTER SYMPTOMS
CONSTIPATION: 0
CHEST TIGHTNESS: 0
DIARRHEA: 0
COUGH: 0
BLOOD IN STOOL: 0
SHORTNESS OF BREATH: 0

## 2024-06-03 ASSESSMENT — PATIENT HEALTH QUESTIONNAIRE - PHQ9: DEPRESSION UNABLE TO ASSESS: FUNCTIONAL CAPACITY MOTIVATION LIMITS ACCURACY

## 2024-06-03 NOTE — PROGRESS NOTES
6/3/2024    Chief Complaint   Patient presents with    Schizophrenia    Other obsessive-compulsive disorders       Charlene Moralez is a 59 y.o. female, presents today for:      ASSESSMENT/PLAN:    1. Compulsive skin picking  --Controlled today, no new lesions on skin exam.  Reinforced environmental changes.  Nails are too long today and will cause damage.     --NO changes in external environment, patient does not seem more stressed/ anxious than usual.  Skin does seem dry which is likely age related change.  Continue Zyrtec to help with itching and Eucerin for moisture.  Also discussed other conservative measures to reduction in injury from scratching: absolution no long nails, no hot showers (cold water- moderate temps only), possible wearing gloves at night.  --May consider changing SSRI if needed next OV.     2. Other obsessive-compulsive disorders  See above    Return in about 6 months (around 12/3/2024).       Presenting today for chronic disease follow up.  Care provider (Carolyn) reports skin picking has improved and staff is discouraging behavior when they see it.  Charlene receives a bath daily and has not had a change in her laundry detergent or her bath soap.  No new foods.  No other clients in the other with similar rashes.  No systemic symptoms: fever, change in appetite, shortness of breath, change in stools.      Review of Systems   Constitutional: Negative.    Respiratory:  Negative for cough, chest tightness and shortness of breath.    Cardiovascular: Negative.    Gastrointestinal:  Negative for blood in stool, constipation and diarrhea.   Skin: Negative.    Neurological:  Negative for dizziness, tremors, light-headedness and headaches.   Psychiatric/Behavioral:  Negative for decreased concentration, dysphoric mood, self-injury, sleep disturbance and suicidal ideas. The patient is not nervous/anxious.        Current Outpatient Medications on File Prior to Visit   Medication Sig Dispense Refill

## 2024-06-20 ENCOUNTER — HOSPITAL ENCOUNTER (OUTPATIENT)
Dept: WOMENS IMAGING | Age: 59
Discharge: HOME | End: 2024-06-20
Payer: MEDICARE

## 2024-06-20 DIAGNOSIS — R92.333 HETEROGENEOUSLY DENSE TISSUE OF BOTH BREASTS ON MAMMOGRAPHY: ICD-10-CM

## 2024-06-20 DIAGNOSIS — F20.9 SCHIZOPHRENIA, UNSPECIFIED TYPE (HCC): ICD-10-CM

## 2024-06-20 DIAGNOSIS — E55.9 VITAMIN D DEFICIENCY: ICD-10-CM

## 2024-06-20 DIAGNOSIS — Z78.0 ENCOUNTER FOR OSTEOPOROSIS SCREENING IN ASYMPTOMATIC POSTMENOPAUSAL PATIENT: ICD-10-CM

## 2024-06-20 DIAGNOSIS — Z13.820 ENCOUNTER FOR OSTEOPOROSIS SCREENING IN ASYMPTOMATIC POSTMENOPAUSAL PATIENT: ICD-10-CM

## 2024-06-20 DIAGNOSIS — F79 INTELLECTUAL DISABILITY: ICD-10-CM

## 2024-06-20 PROCEDURE — 76641 ULTRASOUND BREAST COMPLETE: CPT

## 2024-06-20 PROCEDURE — 77080 DXA BONE DENSITY AXIAL: CPT

## 2024-06-21 DIAGNOSIS — E55.9 VITAMIN D DEFICIENCY: ICD-10-CM

## 2024-06-21 DIAGNOSIS — M85.80 OSTEOPENIA, UNSPECIFIED LOCATION: Primary | ICD-10-CM

## 2024-06-21 RX ORDER — CHOLECALCIFEROL (VITAMIN D3) 50 MCG
2000 TABLET ORAL DAILY
Qty: 30 TABLET | Refills: 11 | Status: SHIPPED | OUTPATIENT
Start: 2024-06-21

## 2024-06-21 NOTE — RESULT ENCOUNTER NOTE
Bone Density scan showing mild bone loss.  Continue supplemental Vitamin D.  I decreased it from 50,000 IU weekly to 2000 IU daily.  Will recheck this level at next appointment

## 2024-09-24 ENCOUNTER — TELEPHONE (OUTPATIENT)
Dept: FAMILY MEDICINE CLINIC | Age: 59
End: 2024-09-24

## 2024-10-24 DIAGNOSIS — F20.9 SCHIZOPHRENIA, UNSPECIFIED TYPE (HCC): ICD-10-CM

## 2024-10-24 DIAGNOSIS — F79 INTELLECTUAL DISABILITY: ICD-10-CM

## 2024-10-24 DIAGNOSIS — F42.4 DERMATILLOMANIA IN ADULT: ICD-10-CM

## 2024-10-25 RX ORDER — DIVALPROEX SODIUM 250 MG/1
TABLET, DELAYED RELEASE ORAL
Qty: 62 TABLET | Refills: 11 | Status: SHIPPED | OUTPATIENT
Start: 2024-10-25

## 2024-10-25 RX ORDER — OLANZAPINE 20 MG/1
20 TABLET ORAL NIGHTLY
Qty: 31 TABLET | Refills: 11 | Status: SHIPPED | OUTPATIENT
Start: 2024-10-25

## 2024-10-25 RX ORDER — SERTRALINE HYDROCHLORIDE 100 MG/1
TABLET, FILM COATED ORAL
Qty: 46.5 TABLET | Refills: 11 | Status: SHIPPED | OUTPATIENT
Start: 2024-10-25

## 2024-12-03 ENCOUNTER — TELEPHONE (OUTPATIENT)
Dept: FAMILY MEDICINE CLINIC | Age: 59
End: 2024-12-03

## 2024-12-03 NOTE — TELEPHONE ENCOUNTER
Pt is refusing to come to appt today.   They had to cancel last minute.   Carolyn is requesting a Friday for her due to her behavioral issues.   Easier for them to bring her on a day when she does not have work shop.   Please advise.

## 2024-12-13 ENCOUNTER — OFFICE VISIT (OUTPATIENT)
Dept: FAMILY MEDICINE CLINIC | Age: 59
End: 2024-12-13
Payer: MEDICARE

## 2024-12-13 VITALS
DIASTOLIC BLOOD PRESSURE: 74 MMHG | WEIGHT: 153 LBS | OXYGEN SATURATION: 98 % | HEART RATE: 81 BPM | TEMPERATURE: 98.1 F | SYSTOLIC BLOOD PRESSURE: 122 MMHG | BODY MASS INDEX: 29.88 KG/M2

## 2024-12-13 DIAGNOSIS — F42.8 OTHER OBSESSIVE-COMPULSIVE DISORDERS: ICD-10-CM

## 2024-12-13 DIAGNOSIS — F42.4 COMPULSIVE SKIN PICKING: Primary | ICD-10-CM

## 2024-12-13 DIAGNOSIS — R39.81 FUNCTIONAL INCONTINENCE: ICD-10-CM

## 2024-12-13 DIAGNOSIS — F79 INTELLECTUAL DISABILITY: ICD-10-CM

## 2024-12-13 DIAGNOSIS — F20.9 SCHIZOPHRENIA, UNSPECIFIED TYPE (HCC): ICD-10-CM

## 2024-12-13 PROCEDURE — 1036F TOBACCO NON-USER: CPT | Performed by: NURSE PRACTITIONER

## 2024-12-13 PROCEDURE — G8427 DOCREV CUR MEDS BY ELIG CLIN: HCPCS | Performed by: NURSE PRACTITIONER

## 2024-12-13 PROCEDURE — G8419 CALC BMI OUT NRM PARAM NOF/U: HCPCS | Performed by: NURSE PRACTITIONER

## 2024-12-13 PROCEDURE — 3017F COLORECTAL CA SCREEN DOC REV: CPT | Performed by: NURSE PRACTITIONER

## 2024-12-13 PROCEDURE — G8484 FLU IMMUNIZE NO ADMIN: HCPCS | Performed by: NURSE PRACTITIONER

## 2024-12-13 PROCEDURE — 99214 OFFICE O/P EST MOD 30 MIN: CPT | Performed by: NURSE PRACTITIONER

## 2024-12-13 RX ORDER — PROTECTIVES, O.U.
3 AEROSOL, SPRAY (ML) TOPICAL DAILY PRN
Qty: 28 ML | Refills: 0 | Status: SHIPPED | OUTPATIENT
Start: 2024-12-13

## 2024-12-13 RX ORDER — MEMANTINE HYDROCHLORIDE 2 MG/ML
SOLUTION ORAL
Qty: 300 ML | Refills: 0 | Status: SHIPPED | OUTPATIENT
Start: 2024-12-13

## 2024-12-13 ASSESSMENT — PATIENT HEALTH QUESTIONNAIRE - PHQ9
8. MOVING OR SPEAKING SO SLOWLY THAT OTHER PEOPLE COULD HAVE NOTICED. OR THE OPPOSITE, BEING SO FIGETY OR RESTLESS THAT YOU HAVE BEEN MOVING AROUND A LOT MORE THAN USUAL: NOT AT ALL
SUM OF ALL RESPONSES TO PHQ QUESTIONS 1-9: 0
6. FEELING BAD ABOUT YOURSELF - OR THAT YOU ARE A FAILURE OR HAVE LET YOURSELF OR YOUR FAMILY DOWN: NOT AT ALL
1. LITTLE INTEREST OR PLEASURE IN DOING THINGS: NOT AT ALL
SUM OF ALL RESPONSES TO PHQ QUESTIONS 1-9: 0
2. FEELING DOWN, DEPRESSED OR HOPELESS: NOT AT ALL
5. POOR APPETITE OR OVEREATING: NOT AT ALL
SUM OF ALL RESPONSES TO PHQ QUESTIONS 1-9: 0
7. TROUBLE CONCENTRATING ON THINGS, SUCH AS READING THE NEWSPAPER OR WATCHING TELEVISION: NOT AT ALL
3. TROUBLE FALLING OR STAYING ASLEEP: NOT AT ALL
4. FEELING TIRED OR HAVING LITTLE ENERGY: NOT AT ALL
9. THOUGHTS THAT YOU WOULD BE BETTER OFF DEAD, OR OF HURTING YOURSELF: NOT AT ALL
10. IF YOU CHECKED OFF ANY PROBLEMS, HOW DIFFICULT HAVE THESE PROBLEMS MADE IT FOR YOU TO DO YOUR WORK, TAKE CARE OF THINGS AT HOME, OR GET ALONG WITH OTHER PEOPLE: NOT DIFFICULT AT ALL
SUM OF ALL RESPONSES TO PHQ QUESTIONS 1-9: 0
DEPRESSION UNABLE TO ASSESS: FUNCTIONAL CAPACITY MOTIVATION LIMITS ACCURACY
SUM OF ALL RESPONSES TO PHQ9 QUESTIONS 1 & 2: 0

## 2024-12-13 ASSESSMENT — ENCOUNTER SYMPTOMS
COUGH: 0
SHORTNESS OF BREATH: 0
CONSTIPATION: 0
DIARRHEA: 0
CHEST TIGHTNESS: 0
BLOOD IN STOOL: 0

## 2024-12-13 NOTE — PROGRESS NOTES
deformity, effusion, erythema, ecchymosis, lacerations or bony tenderness. Normal range of motion. No tenderness. No LCL laxity, MCL laxity, ACL laxity or PCL laxity.Normal alignment, normal meniscus and normal patellar mobility. Normal pulse.      Instability Tests: Anterior drawer test negative. Posterior drawer test negative. Anterior Lachman test negative. Medial Ramila test negative and lateral Ramila test negative.      Right lower leg: No edema.      Left lower leg: No edema.      Right ankle: Normal.      Left ankle: Normal.   Lymphadenopathy:      Cervical: No cervical adenopathy.   Skin:     General: Skin is warm and dry.      Capillary Refill: Capillary refill takes less than 2 seconds.      Coloration: Skin is not jaundiced.      Findings: No rash.      Comments: Multiple abrasions on chest, bilateral legs.  No s/s infection.     Generalized skin dryness with scarring on arms, chest   Neurological:      General: No focal deficit present.      Mental Status: She is alert. Mental status is at baseline.      Cranial Nerves: No cranial nerve deficit.      Sensory: No sensory deficit.      Motor: No weakness.      Coordination: Coordination normal.      Gait: Gait normal.      Deep Tendon Reflexes: Reflexes normal.      Comments: Oriented to person and self, which is her baseline   Psychiatric:         Attention and Perception: Attention and perception normal.         Mood and Affect: Mood and affect normal.         Speech: Speech normal.         Behavior: Behavior is slowed. Behavior is cooperative.         Thought Content: Thought content is not paranoid or delusional.         Cognition and Memory: Cognition is impaired.         Judgment: Judgment is not impulsive or inappropriate.      Comments: She is at her baseline level of neurodevelopmental status           Patient's questions answered and concerns addressed. Patient agrees to plan of care.    Patient should call the office immediately with new or

## 2024-12-23 RX ORDER — LACTULOSE 10 G/15ML
SOLUTION ORAL
Qty: 473 ML | Refills: 11 | Status: SHIPPED | OUTPATIENT
Start: 2024-12-23

## 2024-12-23 RX ORDER — IBUPROFEN 200 MG
CAPSULE ORAL
Qty: 28 G | Refills: 11 | Status: SHIPPED | OUTPATIENT
Start: 2024-12-23

## 2024-12-23 RX ORDER — BISMUTH SUBSALICYLATE 525 MG/15ML
SUSPENSION ORAL
Qty: 24 TABLET | Refills: 11 | Status: SHIPPED | OUTPATIENT
Start: 2024-12-23

## 2024-12-23 RX ORDER — ACETAMINOPHEN 325 MG/1
TABLET ORAL
Qty: 60 TABLET | Refills: 11 | Status: SHIPPED | OUTPATIENT
Start: 2024-12-23

## 2024-12-23 RX ORDER — IBUPROFEN 200 MG
TABLET ORAL
Qty: 60 TABLET | Refills: 11 | Status: SHIPPED | OUTPATIENT
Start: 2024-12-23

## 2025-01-07 DIAGNOSIS — F79 INTELLECTUAL DISABILITY: ICD-10-CM

## 2025-01-07 DIAGNOSIS — F42.8 OTHER OBSESSIVE-COMPULSIVE DISORDERS: ICD-10-CM

## 2025-01-07 DIAGNOSIS — F42.4 COMPULSIVE SKIN PICKING: ICD-10-CM

## 2025-01-07 DIAGNOSIS — F20.9 SCHIZOPHRENIA, UNSPECIFIED TYPE (HCC): ICD-10-CM

## 2025-01-07 RX ORDER — MEMANTINE HYDROCHLORIDE 2 MG/ML
SOLUTION ORAL
Qty: 360 ML | Refills: 11 | Status: SHIPPED | OUTPATIENT
Start: 2025-01-07

## 2025-01-15 DIAGNOSIS — F42.4 COMPULSIVE SKIN PICKING: ICD-10-CM

## 2025-01-15 RX ORDER — CETIRIZINE HYDROCHLORIDE 10 MG/1
TABLET ORAL
Qty: 31 TABLET | Refills: 11 | Status: SHIPPED | OUTPATIENT
Start: 2025-01-15

## 2025-01-29 RX ORDER — PSEUDOEPHEDRINE HCL 30 MG/1
TABLET, FILM COATED ORAL
Qty: 30 TABLET | Refills: 11 | Status: SHIPPED | OUTPATIENT
Start: 2025-01-29

## 2025-02-27 DIAGNOSIS — F42.4 COMPULSIVE SKIN PICKING: ICD-10-CM

## 2025-02-28 RX ORDER — AMMONIUM LACTATE 12 G/100G
CREAM TOPICAL
Qty: 385 G | Refills: 11 | Status: SHIPPED | OUTPATIENT
Start: 2025-02-28

## 2025-03-17 ENCOUNTER — OFFICE VISIT (OUTPATIENT)
Dept: FAMILY MEDICINE CLINIC | Age: 60
End: 2025-03-17
Payer: MEDICARE

## 2025-03-17 VITALS
OXYGEN SATURATION: 94 % | BODY MASS INDEX: 30.27 KG/M2 | WEIGHT: 155 LBS | SYSTOLIC BLOOD PRESSURE: 109 MMHG | HEART RATE: 80 BPM | DIASTOLIC BLOOD PRESSURE: 70 MMHG

## 2025-03-17 DIAGNOSIS — R39.81 FUNCTIONAL INCONTINENCE: ICD-10-CM

## 2025-03-17 DIAGNOSIS — Z00.00 MEDICARE ANNUAL WELLNESS VISIT, SUBSEQUENT: Primary | ICD-10-CM

## 2025-03-17 DIAGNOSIS — E55.9 VITAMIN D DEFICIENCY: ICD-10-CM

## 2025-03-17 DIAGNOSIS — F42.8 OTHER OBSESSIVE-COMPULSIVE DISORDERS: ICD-10-CM

## 2025-03-17 DIAGNOSIS — Z12.31 SCREENING MAMMOGRAM, ENCOUNTER FOR: ICD-10-CM

## 2025-03-17 DIAGNOSIS — Z12.11 COLON CANCER SCREENING: ICD-10-CM

## 2025-03-17 DIAGNOSIS — E03.9 HYPOTHYROIDISM, UNSPECIFIED TYPE: ICD-10-CM

## 2025-03-17 DIAGNOSIS — E78.5 HYPERLIPIDEMIA, UNSPECIFIED HYPERLIPIDEMIA TYPE: ICD-10-CM

## 2025-03-17 DIAGNOSIS — F20.9 SCHIZOPHRENIA, UNSPECIFIED TYPE: ICD-10-CM

## 2025-03-17 DIAGNOSIS — F79 INTELLECTUAL DISABILITY: ICD-10-CM

## 2025-03-17 PROCEDURE — G0439 PPPS, SUBSEQ VISIT: HCPCS | Performed by: NURSE PRACTITIONER

## 2025-03-17 PROCEDURE — 3017F COLORECTAL CA SCREEN DOC REV: CPT | Performed by: NURSE PRACTITIONER

## 2025-03-17 RX ORDER — PEN NEEDLE, DIABETIC 32GX 5/32"
NEEDLE, DISPOSABLE MISCELLANEOUS
Status: CANCELLED | OUTPATIENT
Start: 2025-03-17

## 2025-03-17 SDOH — ECONOMIC STABILITY: FOOD INSECURITY: WITHIN THE PAST 12 MONTHS, YOU WORRIED THAT YOUR FOOD WOULD RUN OUT BEFORE YOU GOT MONEY TO BUY MORE.: NEVER TRUE

## 2025-03-17 SDOH — HEALTH STABILITY: PHYSICAL HEALTH: ON AVERAGE, HOW MANY DAYS PER WEEK DO YOU ENGAGE IN MODERATE TO STRENUOUS EXERCISE (LIKE A BRISK WALK)?: 0 DAYS

## 2025-03-17 SDOH — HEALTH STABILITY: PHYSICAL HEALTH: ON AVERAGE, HOW MANY MINUTES DO YOU ENGAGE IN EXERCISE AT THIS LEVEL?: 0 MIN

## 2025-03-17 SDOH — ECONOMIC STABILITY: FOOD INSECURITY: WITHIN THE PAST 12 MONTHS, THE FOOD YOU BOUGHT JUST DIDN'T LAST AND YOU DIDN'T HAVE MONEY TO GET MORE.: NEVER TRUE

## 2025-03-17 SDOH — ECONOMIC STABILITY: INCOME INSECURITY: IN THE LAST 12 MONTHS, WAS THERE A TIME WHEN YOU WERE NOT ABLE TO PAY THE MORTGAGE OR RENT ON TIME?: NO

## 2025-03-17 SDOH — ECONOMIC STABILITY: TRANSPORTATION INSECURITY
IN THE PAST 12 MONTHS, HAS LACK OF TRANSPORTATION KEPT YOU FROM MEETINGS, WORK, OR FROM GETTING THINGS NEEDED FOR DAILY LIVING?: NO

## 2025-03-17 SDOH — ECONOMIC STABILITY: TRANSPORTATION INSECURITY
IN THE PAST 12 MONTHS, HAS THE LACK OF TRANSPORTATION KEPT YOU FROM MEDICAL APPOINTMENTS OR FROM GETTING MEDICATIONS?: NO

## 2025-03-17 ASSESSMENT — ENCOUNTER SYMPTOMS
BLOOD IN STOOL: 0
DIARRHEA: 0
CONSTIPATION: 0
SHORTNESS OF BREATH: 0
COUGH: 0
CHEST TIGHTNESS: 0

## 2025-03-17 ASSESSMENT — PATIENT HEALTH QUESTIONNAIRE - PHQ9
SUM OF ALL RESPONSES TO PHQ QUESTIONS 1-9: 0
SUM OF ALL RESPONSES TO PHQ QUESTIONS 1-9: 0
2. FEELING DOWN, DEPRESSED OR HOPELESS: NOT AT ALL
1. LITTLE INTEREST OR PLEASURE IN DOING THINGS: NOT AT ALL
SUM OF ALL RESPONSES TO PHQ QUESTIONS 1-9: 0
SUM OF ALL RESPONSES TO PHQ QUESTIONS 1-9: 0

## 2025-03-17 ASSESSMENT — LIFESTYLE VARIABLES
HOW OFTEN DO YOU HAVE A DRINK CONTAINING ALCOHOL: 1
HOW OFTEN DO YOU HAVE A DRINK CONTAINING ALCOHOL: NEVER
HOW MANY STANDARD DRINKS CONTAINING ALCOHOL DO YOU HAVE ON A TYPICAL DAY: 0
HOW OFTEN DO YOU HAVE SIX OR MORE DRINKS ON ONE OCCASION: 1
HOW MANY STANDARD DRINKS CONTAINING ALCOHOL DO YOU HAVE ON A TYPICAL DAY: PATIENT DOES NOT DRINK

## 2025-03-17 NOTE — PROGRESS NOTES
3/17/2025    Chief Complaint   Patient presents with    Medicare AWV    Schizophrenia    Compulsive skin picking    Functional incontinence       Charlene Moralez is a 59 y.o. female, presents today for:      ASSESSMENT/PLAN:  1. Medicare annual wellness visit, subsequent  AWV completed today, see documentation below.  Continues to live in group home.  No change in eating.  Needs reusable incontinence pads today    2. Hypothyroidism, unspecified type  Previously controlled 9/2023 with tSH 1.62.   Updated labs ordered today  - TSH reflex to FT4,FT3 ([facility] Only); Future    3. Schizophrenia, unspecified type (HCC)  Controlled today  Continue currents meds  - Valproic Acid Level, Total and Free; Future    4. Other obsessive-compulsive disorders  Better controlled than previously.  No longer picking skin as much as previously.    --Continue Memantine BID.  Due to difficulty swallowing pills will order liquid form.  Discussed side effects with careprovider, patient info given today  --Continue Sertraline 150 daily, Zyprexa 20 daily.    --NO changes in external environment, patient does not seem more stressed/ anxious than usual.  Skin does seem dry which is likely age related change.  Continue Zyrtec to help with itching and Eucerin for moisture.  Also discussed other conservative measures to reduction in injury from scratching: absolution no long nails, no hot showers (cold water- moderate temps only), possible wearing gloves at night.  - CBC with Auto Differential; Future  - Comprehensive Metabolic Panel; Future    5. Functional incontinence  No change in functional incontinence secondary to intellectual disability.    Continue PRN incontinence pads- pt mostly using at night.     6. Hyperlipidemia, unspecified hyperlipidemia type  Updated labs ordered today  Last  9/2023  - LIPID PANEL; Future    7. Mental retardation  Continues to live in group home    8. Vitamin D deficiency  Updated labs ordered

## 2025-03-20 ENCOUNTER — CLINICAL SUPPORT (OUTPATIENT)
Dept: FAMILY MEDICINE CLINIC | Age: 60
End: 2025-03-20
Payer: MEDICARE

## 2025-03-20 ENCOUNTER — RESULTS FOLLOW-UP (OUTPATIENT)
Dept: FAMILY MEDICINE CLINIC | Age: 60
End: 2025-03-20

## 2025-03-20 DIAGNOSIS — Z12.11 COLON CANCER SCREENING: ICD-10-CM

## 2025-03-20 LAB
CONTROL: NORMAL
FECAL BLOOD IMMUNOCHEMICAL TEST: NEGATIVE

## 2025-03-20 PROCEDURE — 82274 ASSAY TEST FOR BLOOD FECAL: CPT | Performed by: NURSE PRACTITIONER

## 2025-03-24 NOTE — TELEPHONE ENCOUNTER
----- Message from TAHIRA Herman CNP sent at 3/23/2025  9:45 PM EDT -----  Brandi can you order the reusable Incontinence pads please?  I cannot find the order

## 2025-03-28 ENCOUNTER — PATIENT MESSAGE (OUTPATIENT)
Dept: FAMILY MEDICINE CLINIC | Age: 60
End: 2025-03-28

## 2025-03-28 DIAGNOSIS — R19.7 DIARRHEA, UNSPECIFIED TYPE: Primary | ICD-10-CM

## 2025-03-28 RX ORDER — KETOCONAZOLE 20 MG/ML
SHAMPOO, SUSPENSION TOPICAL
Qty: 120 ML | Refills: 11 | Status: SHIPPED | OUTPATIENT
Start: 2025-03-28

## 2025-04-02 DIAGNOSIS — M85.80 OSTEOPENIA, UNSPECIFIED LOCATION: ICD-10-CM

## 2025-04-02 DIAGNOSIS — E55.9 VITAMIN D DEFICIENCY: ICD-10-CM

## 2025-04-02 RX ORDER — CHOLECALCIFEROL (VITAMIN D3) 50 MCG
TABLET ORAL
Qty: 10 TABLET | Refills: 11 | Status: SHIPPED | OUTPATIENT
Start: 2025-04-02

## 2025-04-02 NOTE — TELEPHONE ENCOUNTER
Refill Request     CONFIRM preferrred pharmacy with the patient.    If Mail Order Rx - Pend for 90 day refill.      Last Seen: Last Seen Department: 3/20/2025  Last Seen by PCP: 3/17/2025      If no future appointment scheduled, route STAFF MESSAGE with patient name to the  Pool for scheduling.      Next Appointment:   Future Appointments   Date Time Provider Department Center   4/22/2025  9:30 AM [facility] M[facility]MO [facility] 1 [facility] M[facility]MO [facility]   9/17/2025 11:00 AM Tamiko Engel APRN - CNP [place] [facility] DE[facility]       Message sent to  to schedule appt with patient?  NO      Requested Prescriptions     Pending Prescriptions Disp Refills    Cholecalciferol (VITAMIN D3) 50 MCG (2000 UT) TABS [Pharmacy Med Name: Vitamin D3 50 MCG (2000 UT) Tablet] 10 tablet 11     Sig: TAKE 1 TABLET BY MOUTH ONCE EVERY DAY (VITAMIN-D DEFICIENCY)

## 2025-05-02 ENCOUNTER — OFFICE VISIT (OUTPATIENT)
Dept: FAMILY MEDICINE CLINIC | Age: 60
End: 2025-05-02
Payer: MEDICARE

## 2025-05-02 ENCOUNTER — RESULTS FOLLOW-UP (OUTPATIENT)
Dept: FAMILY MEDICINE CLINIC | Age: 60
End: 2025-05-02

## 2025-05-02 VITALS
DIASTOLIC BLOOD PRESSURE: 72 MMHG | WEIGHT: 161 LBS | BODY MASS INDEX: 31.44 KG/M2 | SYSTOLIC BLOOD PRESSURE: 122 MMHG | HEART RATE: 63 BPM | OXYGEN SATURATION: 96 %

## 2025-05-02 DIAGNOSIS — R46.89 BEHAVIORAL CHANGE: ICD-10-CM

## 2025-05-02 DIAGNOSIS — R39.9 UTI SYMPTOMS: ICD-10-CM

## 2025-05-02 DIAGNOSIS — Z91.81 HISTORY OF FALL: ICD-10-CM

## 2025-05-02 DIAGNOSIS — M19.042 LOCALIZED OSTEOARTHRITIS OF LEFT HAND: Primary | ICD-10-CM

## 2025-05-02 LAB
BILIRUBIN, POC: ABNORMAL
BLOOD URINE, POC: ABNORMAL
CLARITY, POC: CLEAR
COLOR, POC: ABNORMAL
GLUCOSE URINE, POC: ABNORMAL MG/DL
KETONES, POC: ABNORMAL MG/DL
LEUKOCYTE EST, POC: ABNORMAL
NITRITE, POC: ABNORMAL
PH, POC: 7
PROTEIN, POC: ABNORMAL MG/DL
SPECIFIC GRAVITY, POC: 1.01
UROBILINOGEN, POC: 0.2 MG/DL

## 2025-05-02 PROCEDURE — 1036F TOBACCO NON-USER: CPT | Performed by: NURSE PRACTITIONER

## 2025-05-02 PROCEDURE — G8427 DOCREV CUR MEDS BY ELIG CLIN: HCPCS | Performed by: NURSE PRACTITIONER

## 2025-05-02 PROCEDURE — 99213 OFFICE O/P EST LOW 20 MIN: CPT | Performed by: NURSE PRACTITIONER

## 2025-05-02 PROCEDURE — 81002 URINALYSIS NONAUTO W/O SCOPE: CPT | Performed by: NURSE PRACTITIONER

## 2025-05-02 PROCEDURE — 3017F COLORECTAL CA SCREEN DOC REV: CPT | Performed by: NURSE PRACTITIONER

## 2025-05-02 PROCEDURE — G8417 CALC BMI ABV UP PARAM F/U: HCPCS | Performed by: NURSE PRACTITIONER

## 2025-05-02 RX ORDER — AMOXICILLIN 200 MG/5ML
500 POWDER, FOR SUSPENSION ORAL 3 TIMES DAILY
Qty: 187.5 ML | Refills: 0 | Status: SHIPPED | OUTPATIENT
Start: 2025-05-02 | End: 2025-05-07

## 2025-05-02 NOTE — PROGRESS NOTES
5/2/2025    Chief Complaint   Patient presents with    Swelling     Both hands are swelling and her pointer finger on left hand  has been hurting     skin picking    behavior changes       Charlene Moralez is a 60 y.o. female, presents today for:      ASSESSMENT/PLAN:    1. Localized osteoarthritis of left hand  Unclear if hand swelling related to recent fall or OA changes or additional etiology.  Encouraged to obtain previously ordered labs    2. Behavioral change  POCT UA showing large leukocytes concerning for possible UTI due to behavior changes.  Will order Amoxicillin liquid for now as patient cannot swallow pills.  Continue current medications for now for skin picking-- hoping with staff changes Charlene will eventually get used to new staff and symptoms will improve  - POCT Urinalysis no Micro  - amoxicillin (AMOXIL) 200 MG/5ML suspension; Take 12.5 mLs by mouth 3 times daily for 5 days  Dispense: 187.5 mL; Refill: 0    3. UTI symptoms  See above  - POCT Urinalysis no Micro  - amoxicillin (AMOXIL) 200 MG/5ML suspension; Take 12.5 mLs by mouth 3 times daily for 5 days  Dispense: 187.5 mL; Refill: 0    4. History of fall  Monitor for now.        Return for 2-3 weeks behavior changes, left hand swelling.    Presenting today due to left hand and left 2nd finger swelling.  Skin picking has increased at home.  At home she is not speaking to people.  At workshop she is not talking.  Have to ask her several times to take a shower or eat.  But she will go outside with no problem.  Staff notes she recently lost her Home Lead to another home.  Behavior changes started not long after this event.  Staff also concerned UTI could be cause for behavior changes, brought urine for testing     Lab Results   Component Value Date     11/29/2023    K 4.2 11/29/2023     11/29/2023    CO2 28 11/29/2023    BUN 9 11/29/2023    CREATININE 0.6 11/29/2023    GLUCOSE 87 11/29/2023    CALCIUM 9.7 11/29/2023    BILITOT 0.3

## 2025-05-13 ENCOUNTER — HOSPITAL ENCOUNTER (OUTPATIENT)
Dept: MAMMOGRAPHY | Age: 60
Discharge: HOME | End: 2025-05-13
Payer: MEDICARE

## 2025-05-13 ENCOUNTER — RESULTS FOLLOW-UP (OUTPATIENT)
Dept: FAMILY MEDICINE CLINIC | Age: 60
End: 2025-05-13

## 2025-05-13 DIAGNOSIS — Z12.31 SCREENING MAMMOGRAM, ENCOUNTER FOR: ICD-10-CM

## 2025-05-13 PROCEDURE — 77063 BREAST TOMOSYNTHESIS BI: CPT

## 2025-05-13 NOTE — RESULT ENCOUNTER NOTE
Mammogram negative for breast cancer.  Breasts continue to be dense.  As we did the additional workup last year for this lets continue to monitor and discuss next year.

## 2025-05-23 ENCOUNTER — OFFICE VISIT (OUTPATIENT)
Dept: FAMILY MEDICINE CLINIC | Age: 60
End: 2025-05-23
Payer: MEDICARE

## 2025-05-23 VITALS
SYSTOLIC BLOOD PRESSURE: 132 MMHG | DIASTOLIC BLOOD PRESSURE: 72 MMHG | WEIGHT: 160 LBS | HEART RATE: 92 BPM | OXYGEN SATURATION: 97 % | BODY MASS INDEX: 31.25 KG/M2

## 2025-05-23 DIAGNOSIS — R46.89 BEHAVIORAL CHANGE: Primary | ICD-10-CM

## 2025-05-23 DIAGNOSIS — M19.042 LOCALIZED OSTEOARTHRITIS OF LEFT HAND: ICD-10-CM

## 2025-05-23 PROCEDURE — G8417 CALC BMI ABV UP PARAM F/U: HCPCS | Performed by: NURSE PRACTITIONER

## 2025-05-23 PROCEDURE — G8427 DOCREV CUR MEDS BY ELIG CLIN: HCPCS | Performed by: NURSE PRACTITIONER

## 2025-05-23 PROCEDURE — 99213 OFFICE O/P EST LOW 20 MIN: CPT | Performed by: NURSE PRACTITIONER

## 2025-05-23 PROCEDURE — 1036F TOBACCO NON-USER: CPT | Performed by: NURSE PRACTITIONER

## 2025-05-23 PROCEDURE — 3017F COLORECTAL CA SCREEN DOC REV: CPT | Performed by: NURSE PRACTITIONER

## 2025-06-09 ENCOUNTER — PATIENT MESSAGE (OUTPATIENT)
Dept: FAMILY MEDICINE CLINIC | Age: 60
End: 2025-06-09

## 2025-06-09 DIAGNOSIS — R46.89 BEHAVIORAL CHANGE: ICD-10-CM

## 2025-06-09 DIAGNOSIS — R39.9 UTI SYMPTOMS: Primary | ICD-10-CM

## 2025-06-10 ENCOUNTER — TELEPHONE (OUTPATIENT)
Dept: FAMILY MEDICINE CLINIC | Age: 60
End: 2025-06-10

## 2025-06-10 NOTE — TELEPHONE ENCOUNTER
Caregiver states patient has had diarrhea for the past few days. She said staff has noticed that it has a strong odor.  There is another resident in the home that was positive for c diff, so they are concerned that she may have it as well.  Would you want to order stool studies for her?  She states she is not having any other symptoms

## 2025-06-11 NOTE — TELEPHONE ENCOUNTER
Spoke with Carolyn, states that diarrhea has improved, patient had BM last night and was formed. Was just calling to see what symptoms they need to be on the lookout for since another of the residents has tested positive? Please advise.

## 2025-06-11 NOTE — TELEPHONE ENCOUNTER
Noted.  Thanks for update.    Usually diarrhea will be EXTREMELY malodorous.  And very very very watery.  Its usually pretty clear it is C-Diff because it is like all water.  Usually a dark greenish black/ brown

## 2025-06-11 NOTE — TELEPHONE ENCOUNTER
Need to see in office before ordering studies.  We can also give all of the stool collection thing and discuss instructions at that time

## 2025-06-12 ENCOUNTER — HOSPITAL ENCOUNTER (EMERGENCY)
Age: 60
Discharge: HOME | End: 2025-06-12
Attending: INTERNAL MEDICINE
Payer: MEDICARE

## 2025-06-12 VITALS
TEMPERATURE: 98.3 F | WEIGHT: 157.9 LBS | SYSTOLIC BLOOD PRESSURE: 150 MMHG | HEART RATE: 98 BPM | RESPIRATION RATE: 16 BRPM | DIASTOLIC BLOOD PRESSURE: 71 MMHG | OXYGEN SATURATION: 96 % | BODY MASS INDEX: 30.84 KG/M2

## 2025-06-12 DIAGNOSIS — R19.7 ACUTE DIARRHEA: Primary | ICD-10-CM

## 2025-06-12 DIAGNOSIS — R53.1 GENERALIZED WEAKNESS: ICD-10-CM

## 2025-06-12 LAB
ALBUMIN SERPL-MCNC: 4 G/DL (ref 3.4–5)
ALBUMIN/GLOB SERPL: 1.3 {RATIO} (ref 1.1–2.2)
ALP SERPL-CCNC: 72 U/L (ref 40–129)
ALT SERPL-CCNC: 19 U/L (ref 10–40)
ANION GAP SERPL CALCULATED.3IONS-SCNC: 13 MMOL/L (ref 3–16)
AST SERPL-CCNC: 22 U/L (ref 15–37)
BASOPHILS # BLD: 0 K/UL (ref 0–0.2)
BASOPHILS NFR BLD: 0.4 %
BILIRUB SERPL-MCNC: <0.2 MG/DL (ref 0–1)
BUN SERPL-MCNC: 11 MG/DL (ref 7–20)
CALCIUM SERPL-MCNC: 9.4 MG/DL (ref 8.3–10.6)
CHLORIDE SERPL-SCNC: 105 MMOL/L (ref 99–110)
CO2 SERPL-SCNC: 24 MMOL/L (ref 21–32)
CREAT SERPL-MCNC: 0.6 MG/DL (ref 0.6–1.2)
DEPRECATED RDW RBC AUTO: 13.6 % (ref 12.4–15.4)
EOSINOPHIL # BLD: 0 K/UL (ref 0–0.6)
EOSINOPHIL NFR BLD: 0.4 %
GFR SERPLBLD CREATININE-BSD FMLA CKD-EPI: >90 ML/MIN/{1.73_M2}
GLUCOSE SERPL-MCNC: 148 MG/DL (ref 70–99)
HCT VFR BLD AUTO: 38.9 % (ref 36–48)
HGB BLD-MCNC: 12.7 G/DL (ref 12–16)
LIPASE SERPL-CCNC: 29 U/L (ref 13–60)
LYMPHOCYTES # BLD: 1.9 K/UL (ref 1–5.1)
LYMPHOCYTES NFR BLD: 29.9 %
MCH RBC QN AUTO: 28.4 PG (ref 26–34)
MCHC RBC AUTO-ENTMCNC: 32.8 G/DL (ref 31–36)
MCV RBC AUTO: 86.8 FL (ref 80–100)
MONOCYTES # BLD: 0.4 K/UL (ref 0–1.3)
MONOCYTES NFR BLD: 5.8 %
NEUTROPHILS # BLD: 4.1 K/UL (ref 1.7–7.7)
NEUTROPHILS NFR BLD: 63.5 %
PLATELET # BLD AUTO: 245 K/UL (ref 135–450)
PMV BLD AUTO: 8 FL (ref 5–10.5)
POTASSIUM SERPL-SCNC: 3.6 MMOL/L (ref 3.5–5.1)
PROT SERPL-MCNC: 7 G/DL (ref 6.4–8.2)
RBC # BLD AUTO: 4.48 M/UL (ref 4–5.2)
SODIUM SERPL-SCNC: 142 MMOL/L (ref 136–145)
WBC # BLD AUTO: 6.5 K/UL (ref 4–11)

## 2025-06-12 PROCEDURE — 85025 COMPLETE CBC W/AUTO DIFF WBC: CPT

## 2025-06-12 PROCEDURE — 83690 ASSAY OF LIPASE: CPT

## 2025-06-12 PROCEDURE — 80053 COMPREHEN METABOLIC PANEL: CPT

## 2025-06-12 PROCEDURE — 99284 EMERGENCY DEPT VISIT MOD MDM: CPT

## 2025-06-12 PROCEDURE — 36415 COLL VENOUS BLD VENIPUNCTURE: CPT

## 2025-06-12 RX ORDER — 0.9 % SODIUM CHLORIDE 0.9 %
1000 INTRAVENOUS SOLUTION INTRAVENOUS ONCE
Status: DISCONTINUED | OUTPATIENT
Start: 2025-06-12 | End: 2025-06-12 | Stop reason: HOSPADM

## 2025-06-12 RX ADMIN — Medication 1000 ML: at 19:59

## 2025-06-12 ASSESSMENT — LIFESTYLE VARIABLES
HOW MANY STANDARD DRINKS CONTAINING ALCOHOL DO YOU HAVE ON A TYPICAL DAY: PATIENT DOES NOT DRINK
HOW OFTEN DO YOU HAVE A DRINK CONTAINING ALCOHOL: NEVER

## 2025-06-12 ASSESSMENT — PAIN - FUNCTIONAL ASSESSMENT: PAIN_FUNCTIONAL_ASSESSMENT: WONG-BAKER FACES

## 2025-06-12 ASSESSMENT — PAIN DESCRIPTION - LOCATION: LOCATION: ABDOMEN

## 2025-06-12 ASSESSMENT — PAIN DESCRIPTION - PAIN TYPE: TYPE: ACUTE PAIN

## 2025-06-12 ASSESSMENT — PAIN SCALES - WONG BAKER: WONGBAKER_NUMERICALRESPONSE: HURTS A LITTLE BIT

## 2025-06-12 NOTE — ED TRIAGE NOTES
Pts caregiver brought her in for bad pain. She states that pts roommate was dx with c-diff. Pt for the last 4 days is not eating much, drinking much, and has diarrhea. Pt is MRDD

## 2025-06-12 NOTE — ED PROVIDER NOTES
EMERGENCY MEDICINE PROVIDER NOTE    Patient Identification  Pt Name: Charlene Moralez  MRN: [id number]  Birthdate 1965  Date of evaluation: 6/12/2025  Provider: ALEJANDRA ULLOA DO  PCP: Tamiko Engel APRN - CNP    Chief Complaint  Abdominal Pain (Pts care giver states that pts has diarrhea with a foul smell, not eating or drinking)      HPI  (History provided by caregiver bedside)  This is a 60 y.o. female who was brought in by  caregiver  for diarrhea, fatigue and decreased oral intake since Saturday.  She has not had any fever or chills.  Her roommate was found to be positive for C. difficile and sepsis recently.  She has not had any nausea or vomiting.  Patient does live in a group home.  There has not been any blood in the stool.  She has not complained of abdominal pain.    I have reviewed the following nursing documentation:  Allergies: Patient has no known allergies.    Past medical history:   Past Medical History:   Diagnosis Date    Constipation     Hyperlipidemia     Mental retardation, profound (I.Q. < 20)     OCD (obsessive compulsive disorder)     Schizophrenia (HCC)     Vitamin D deficiency      Past surgical history:   Past Surgical History:   Procedure Laterality Date    HYSTERECTOMY (CERVIX STATUS UNKNOWN)         Home medications:   Discharge Medication List as of 6/12/2025  8:27 PM        CONTINUE these medications which have NOT CHANGED    Details   Cholecalciferol (VITAMIN D3) 50 MCG (2000 UT) TABS TAKE 1 TABLET BY MOUTH ONCE EVERY DAY (VITAMIN-D DEFICIENCY), Disp-10 tablet, R-11Normal      bismuth subsalicylate (BISMATROL) 262 MG/15ML suspension 15ml three times daily as needed for heartburn, diarrhea, or upset stomach, Disp-118 mL, R-1Normal      ketoconazole (NIZORAL) 2 % shampoo APPLY TOPICALLY TWICE WEEKLY FOR DANDRUFF, Disp-120 mL, R-11, Normal      !! Incontinence Supply Disposable (DISPOS UNDERPADS/90G/30\"X36\") MISC 1 each by Does not apply route in the morning, at noon, and at  rash.    Procedures          Radiology  No orders to display       Labs  Results for orders placed or performed during the hospital encounter of 06/12/25   Comprehensive Metabolic Panel   Result Value Ref Range    Sodium 142 136 - 145 mmol/L    Potassium 3.6 3.5 - 5.1 mmol/L    Chloride 105 99 - 110 mmol/L    CO2 24 21 - 32 mmol/L    Anion Gap 13 3 - 16    Glucose 148 (H) 70 - 99 mg/dL    BUN 11 7 - 20 mg/dL    Creatinine 0.6 0.6 - 1.2 mg/dL    Est, Glom Filt Rate >90 >60    Calcium 9.4 8.3 - 10.6 mg/dL    Total Protein 7.0 6.4 - 8.2 g/dL    Albumin 4.0 3.4 - 5.0 g/dL    Albumin/Globulin Ratio 1.3 1.1 - 2.2    Total Bilirubin <0.2 0.0 - 1.0 mg/dL    Alkaline Phosphatase 72 40 - 129 U/L    ALT 19 10 - 40 U/L    AST 22 15 - 37 U/L   Lipase   Result Value Ref Range    Lipase 29.0 13.0 - 60.0 U/L   CBC with Auto Differential   Result Value Ref Range    WBC 6.5 4.0 - 11.0 K/uL    RBC 4.48 4.00 - 5.20 M/uL    Hemoglobin 12.7 12.0 - 16.0 g/dL    Hematocrit 38.9 36.0 - 48.0 %    MCV 86.8 80.0 - 100.0 fL    MCH 28.4 26.0 - 34.0 pg    MCHC 32.8 31.0 - 36.0 g/dL    RDW 13.6 12.4 - 15.4 %    Platelets 245 135 - 450 K/uL    MPV 8.0 5.0 - 10.5 fL    Neutrophils % 63.5 %    Lymphocytes % 29.9 %    Monocytes % 5.8 %    Eosinophils % 0.4 %    Basophils % 0.4 %    Neutrophils Absolute 4.1 1.7 - 7.7 K/uL    Lymphocytes Absolute 1.9 1.0 - 5.1 K/uL    Monocytes Absolute 0.4 0.0 - 1.3 K/uL    Eosinophils Absolute 0.0 0.0 - 0.6 K/uL    Basophils Absolute 0.0 0.0 - 0.2 K/uL       SEP-1  Is this patient to be included in the SEP-1 Core Measure due to severe sepsis or septic shock?   no    Screenings                    Medications Given During ED Visit  Medications - No data to display    Records Reviewed  I reviewed the patient's previous medical records.      Social Determinants of Health  None    Chronic Conditions affecting care   has a past medical history of Constipation, Hyperlipidemia, Mental retardation, profound (I.Q. < 20), OCD

## 2025-06-13 ENCOUNTER — TELEPHONE (OUTPATIENT)
Dept: FAMILY MEDICINE CLINIC | Age: 60
End: 2025-06-13

## 2025-06-13 DIAGNOSIS — R19.7 DIARRHEA OF PRESUMED INFECTIOUS ORIGIN: Primary | ICD-10-CM

## 2025-06-13 RX ORDER — VANCOMYCIN HYDROCHLORIDE 125 MG/1
125 CAPSULE ORAL 4 TIMES DAILY
Qty: 40 CAPSULE | Refills: 0 | Status: SHIPPED | OUTPATIENT
Start: 2025-06-13 | End: 2025-06-23

## 2025-06-13 NOTE — TELEPHONE ENCOUNTER
Caregiver calling about medication that was prescribed for patient in the ER yesterday.  She was prescribed Dificid but none of the local pharmacies have it on hand due to the cost of the medication.  She wanted to see if there was something else that could be prescribed for her

## 2025-06-18 ENCOUNTER — OFFICE VISIT (OUTPATIENT)
Dept: FAMILY MEDICINE CLINIC | Age: 60
End: 2025-06-18
Payer: MEDICARE

## 2025-06-18 VITALS
SYSTOLIC BLOOD PRESSURE: 112 MMHG | BODY MASS INDEX: 30.47 KG/M2 | TEMPERATURE: 97.1 F | WEIGHT: 156 LBS | DIASTOLIC BLOOD PRESSURE: 76 MMHG | HEART RATE: 82 BPM | OXYGEN SATURATION: 97 %

## 2025-06-18 DIAGNOSIS — R19.7 DIARRHEA OF PRESUMED INFECTIOUS ORIGIN: Primary | ICD-10-CM

## 2025-06-18 DIAGNOSIS — R26.89 NEED FOR ASSISTANCE DUE TO UNSTEADY GAIT: ICD-10-CM

## 2025-06-18 DIAGNOSIS — F79 INTELLECTUAL DISABILITY: ICD-10-CM

## 2025-06-18 DIAGNOSIS — F20.9 SCHIZOPHRENIA, UNSPECIFIED TYPE (HCC): ICD-10-CM

## 2025-06-18 PROCEDURE — G8417 CALC BMI ABV UP PARAM F/U: HCPCS | Performed by: NURSE PRACTITIONER

## 2025-06-18 PROCEDURE — 1036F TOBACCO NON-USER: CPT | Performed by: NURSE PRACTITIONER

## 2025-06-18 PROCEDURE — 3017F COLORECTAL CA SCREEN DOC REV: CPT | Performed by: NURSE PRACTITIONER

## 2025-06-18 PROCEDURE — G8427 DOCREV CUR MEDS BY ELIG CLIN: HCPCS | Performed by: NURSE PRACTITIONER

## 2025-06-18 PROCEDURE — 36415 COLL VENOUS BLD VENIPUNCTURE: CPT | Performed by: NURSE PRACTITIONER

## 2025-06-18 PROCEDURE — 99213 OFFICE O/P EST LOW 20 MIN: CPT | Performed by: NURSE PRACTITIONER

## 2025-06-18 NOTE — PROGRESS NOTES
6/18/2025    Chief Complaint   Patient presents with    Follow-up     Went to [facility] ED 6/12    Diarrhea     This has improved, her last dose of vancomycin will be 6/23       Charlene Moralez is a 60 y.o. female, presents today for:      ASSESSMENT/PLAN:    1. Diarrhea of presumed infectious origin  Presumed C.Diff due to roommate with similar symptoms.  Now eating normally with no diarrhea.  Encouraged to continue Vancomycin for completion of therapy.  No need for test of cure.  Will order labs today for renal monitoring.    - CBC with Auto Differential  - Basic Metabolic Panel    2. Need for assistance due to unsteady gait  Noted slight unsteady gait requiring hand holding through hallway for steadiness.  Will order Rollator today to assist with muscle weakness.    - DME Order for Walker as OP    3. Mental retardation  Likely contributing to irregular gait    4. Schizophrenia, unspecified type (HCC)  See above.        No follow-ups on file.    Presenting today for diarrhea follow up.  Here today with Care Provider, Carolyn    Staff noted diarrhea early last week.  Her roommate was recently diagnosed with C.Diff and staff was concerned she may develop as well.  She was taken to ER due to concerns and started on Dificid.  Patient was unable to have bowel movement for analysis.  Staff was unable to obtain medication so oral Vancomycin initiated on Sat.  Today 6/19 Carolyn reports no diarrhea since Friday morning.  Bowels loose but not watery.  Charlene reports not feeling well.  Has been more unsteady and has been requesting more staff to help her.  Staff has been assisting more with walking and feel she would benefit from Rollater      Medicare Face to Face Documentation  Charlene Moralez was evaluated today and a DME order was entered for a wheeled walker with seat because she requires this to successfully complete daily living tasks of bathing, toileting, personal cares, and ambulating.  A wheeled walker with seat is

## 2025-06-19 LAB
ANION GAP SERPL CALCULATED.3IONS-SCNC: 11 MMOL/L (ref 3–16)
BASOPHILS # BLD: 0.1 K/UL (ref 0–0.2)
BASOPHILS NFR BLD: 0.9 %
BUN SERPL-MCNC: 10 MG/DL (ref 7–20)
CALCIUM SERPL-MCNC: 9.9 MG/DL (ref 8.3–10.6)
CHLORIDE SERPL-SCNC: 102 MMOL/L (ref 99–110)
CO2 SERPL-SCNC: 26 MMOL/L (ref 21–32)
CREAT SERPL-MCNC: 0.6 MG/DL (ref 0.6–1.2)
DEPRECATED RDW RBC AUTO: 13.9 % (ref 12.4–15.4)
EOSINOPHIL # BLD: 0.1 K/UL (ref 0–0.6)
EOSINOPHIL NFR BLD: 1.1 %
GFR SERPLBLD CREATININE-BSD FMLA CKD-EPI: >90 ML/MIN/{1.73_M2}
GLUCOSE SERPL-MCNC: 112 MG/DL (ref 70–99)
HCT VFR BLD AUTO: 38.1 % (ref 36–48)
HGB BLD-MCNC: 13 G/DL (ref 12–16)
LYMPHOCYTES # BLD: 2.1 K/UL (ref 1–5.1)
LYMPHOCYTES NFR BLD: 36.9 %
MCH RBC QN AUTO: 29.1 PG (ref 26–34)
MCHC RBC AUTO-ENTMCNC: 34 G/DL (ref 31–36)
MCV RBC AUTO: 85.5 FL (ref 80–100)
MONOCYTES # BLD: 0.3 K/UL (ref 0–1.3)
MONOCYTES NFR BLD: 6 %
NEUTROPHILS # BLD: 3.2 K/UL (ref 1.7–7.7)
NEUTROPHILS NFR BLD: 55.1 %
PLATELET # BLD AUTO: 240 K/UL (ref 135–450)
PMV BLD AUTO: 9.2 FL (ref 5–10.5)
POTASSIUM SERPL-SCNC: 4.3 MMOL/L (ref 3.5–5.1)
RBC # BLD AUTO: 4.46 M/UL (ref 4–5.2)
SODIUM SERPL-SCNC: 139 MMOL/L (ref 136–145)
WBC # BLD AUTO: 5.8 K/UL (ref 4–11)

## 2025-06-19 ASSESSMENT — ENCOUNTER SYMPTOMS
CHEST TIGHTNESS: 0
SHORTNESS OF BREATH: 0
COUGH: 0
BLOOD IN STOOL: 0
DIARRHEA: 0
CONSTIPATION: 0

## 2025-06-20 ENCOUNTER — RESULTS FOLLOW-UP (OUTPATIENT)
Dept: FAMILY MEDICINE CLINIC | Age: 60
End: 2025-06-20

## 2025-06-23 ENCOUNTER — PATIENT MESSAGE (OUTPATIENT)
Dept: FAMILY MEDICINE CLINIC | Age: 60
End: 2025-06-23

## 2025-06-23 DIAGNOSIS — K59.04 CHRONIC IDIOPATHIC CONSTIPATION: Primary | ICD-10-CM

## 2025-06-23 DIAGNOSIS — Z91.81 HISTORY OF FALL: ICD-10-CM

## 2025-06-23 DIAGNOSIS — R46.89 BEHAVIORAL CHANGE: ICD-10-CM

## 2025-06-23 DIAGNOSIS — R19.7 DIARRHEA, UNSPECIFIED TYPE: ICD-10-CM

## 2025-06-24 RX ORDER — ACETAMINOPHEN 325 MG/1
650 TABLET ORAL EVERY 6 HOURS PRN
Qty: 60 TABLET | Refills: 11 | Status: SHIPPED | OUTPATIENT
Start: 2025-06-24

## 2025-06-24 RX ORDER — LACTULOSE 10 G/15ML
10 SOLUTION ORAL EVERY 8 HOURS PRN
Qty: 473 ML | Refills: 11 | Status: SHIPPED | OUTPATIENT
Start: 2025-06-24

## 2025-07-11 DIAGNOSIS — F42.4 COMPULSIVE SKIN PICKING: ICD-10-CM

## 2025-07-11 DIAGNOSIS — F42.8 OTHER OBSESSIVE-COMPULSIVE DISORDERS: ICD-10-CM

## 2025-07-11 RX ORDER — PROTECTIVES, O.U.
AEROSOL, SPRAY (ML) TOPICAL
Qty: 28 ML | Refills: 11 | Status: SHIPPED | OUTPATIENT
Start: 2025-07-11

## 2025-07-11 RX ORDER — PROTECTIVES, O.U.
AEROSOL, SPRAY (ML) TOPICAL
Qty: 28 ML | Refills: 11 | Status: SHIPPED | OUTPATIENT
Start: 2025-07-11 | End: 2025-07-11

## 2025-07-11 NOTE — TELEPHONE ENCOUNTER
Refill Request     CONFIRM preferrred pharmacy with the patient.    If Mail Order Rx - Pend for 90 day refill.      Last Seen: Last Seen Department: 6/18/2025  Last Seen by PCP: 6/18/2025    Last Written:     If no future appointment scheduled, route STAFF MESSAGE with patient name to the  Pool for scheduling.      Next Appointment:   Future Appointments   Date Time Provider Department Center   9/17/2025 11:00 AM Tamiko Engel, TAHIRA - CNP [place] [facility]       Message sent to  to schedule appt with patient?  NO      Requested Prescriptions     Pending Prescriptions Disp Refills    Skin Protectants, Misc. (NO STING BARRIER FILM) LIQD [Pharmacy Med Name: Cavilon No Sting Barrier Film Liquid] 28 mL 11     Sig: APPLY 3 SPRAYS TOPICALLY ONCE EVERY DAY AS NEEDED (SKIN PICKING). PLACE ON SKIN PRIOR TO ANY BANDAGES, ALLOW TO DRY THEN PLACE BANDAGE (DX: EXCORIATION SKIN-PICKING DISORDER)

## 2025-07-11 NOTE — TELEPHONE ENCOUNTER
Refill Request     CONFIRM preferrred pharmacy with the patient.    If Mail Order Rx - Pend for 90 day refill.      Last Seen: Last Seen Department: 6/18/2025  Last Seen by PCP: 6/18/2025    Last Written:     If no future appointment scheduled, route STAFF MESSAGE with patient name to the  Pool for scheduling.      Next Appointment:   Future Appointments   Date Time Provider Department Center   9/17/2025 11:00 AM Tamiko Engel, TAHIRA - CNP [place] [facility]       Message sent to  to schedule appt with patient?  NO      Requested Prescriptions     Pending Prescriptions Disp Refills    Skin Protectants, Misc. (NO STING BARRIER FILM) LIQD [Pharmacy Med Name: Cavilon No Sting Barrier Film Liquid] 28 mL 11     Sig: APPLY 3 SPRAYS TOPICALLY ONCE EVERY DAY AS NEEDED (SKIN PICKING). PLACE ON SKIN PRIOR TO ANY BANDAGES, ALLOW TO DRY THEN PLACE BANDAGE (DX: EXCORIATION SKIN-PICKING DISORDER)

## 2025-07-15 ENCOUNTER — HOSPITAL ENCOUNTER (OUTPATIENT)
Dept: LAB | Age: 60
Discharge: HOME | End: 2025-07-15
Payer: MEDICARE

## 2025-07-15 ENCOUNTER — PATIENT MESSAGE (OUTPATIENT)
Dept: FAMILY MEDICINE CLINIC | Age: 60
End: 2025-07-15

## 2025-07-15 DIAGNOSIS — F42.8 OTHER OBSESSIVE-COMPULSIVE DISORDERS: ICD-10-CM

## 2025-07-15 DIAGNOSIS — F20.9 SCHIZOPHRENIA, UNSPECIFIED TYPE (HCC): ICD-10-CM

## 2025-07-15 DIAGNOSIS — E78.5 HYPERLIPIDEMIA, UNSPECIFIED HYPERLIPIDEMIA TYPE: ICD-10-CM

## 2025-07-15 DIAGNOSIS — E55.9 VITAMIN D DEFICIENCY: ICD-10-CM

## 2025-07-15 DIAGNOSIS — E03.9 HYPOTHYROIDISM, UNSPECIFIED TYPE: ICD-10-CM

## 2025-07-15 DIAGNOSIS — F42.4 COMPULSIVE SKIN PICKING: Primary | ICD-10-CM

## 2025-07-15 LAB
25(OH)D3 SERPL-MCNC: 68.5 NG/ML
ALBUMIN SERPL-MCNC: 4.2 G/DL (ref 3.4–5)
ALBUMIN/GLOB SERPL: 1.4 {RATIO} (ref 1.1–2.2)
ALP SERPL-CCNC: 88 U/L (ref 40–129)
ALT SERPL-CCNC: 9 U/L (ref 10–40)
ANION GAP SERPL CALCULATED.3IONS-SCNC: 13 MMOL/L (ref 3–16)
AST SERPL-CCNC: 10 U/L (ref 15–37)
BASOPHILS # BLD: 0 K/UL (ref 0–0.2)
BASOPHILS NFR BLD: 0.4 %
BILIRUB SERPL-MCNC: <0.2 MG/DL (ref 0–1)
BUN SERPL-MCNC: 16 MG/DL (ref 7–20)
CALCIUM SERPL-MCNC: 9.8 MG/DL (ref 8.3–10.6)
CHLORIDE SERPL-SCNC: 106 MMOL/L (ref 99–110)
CHOLEST SERPL-MCNC: 216 MG/DL (ref 0–199)
CO2 SERPL-SCNC: 24 MMOL/L (ref 21–32)
CREAT SERPL-MCNC: <0.5 MG/DL (ref 0.6–1.2)
DEPRECATED RDW RBC AUTO: 14 % (ref 12.4–15.4)
EOSINOPHIL # BLD: 0.1 K/UL (ref 0–0.6)
EOSINOPHIL NFR BLD: 1.6 %
GFR SERPLBLD CREATININE-BSD FMLA CKD-EPI: >90 ML/MIN/{1.73_M2}
GLUCOSE SERPL-MCNC: 93 MG/DL (ref 70–99)
HCT VFR BLD AUTO: 38.3 % (ref 36–48)
HDLC SERPL-MCNC: 70 MG/DL (ref 40–60)
HGB BLD-MCNC: 12.8 G/DL (ref 12–16)
LDLC SERPL CALC-MCNC: 129 MG/DL
LYMPHOCYTES # BLD: 2.6 K/UL (ref 1–5.1)
LYMPHOCYTES NFR BLD: 48.8 %
MCH RBC QN AUTO: 28.4 PG (ref 26–34)
MCHC RBC AUTO-ENTMCNC: 33.4 G/DL (ref 31–36)
MCV RBC AUTO: 84.8 FL (ref 80–100)
MONOCYTES # BLD: 0.3 K/UL (ref 0–1.3)
MONOCYTES NFR BLD: 6 %
NEUTROPHILS # BLD: 2.3 K/UL (ref 1.7–7.7)
NEUTROPHILS NFR BLD: 43.2 %
PLATELET # BLD AUTO: 245 K/UL (ref 135–450)
PMV BLD AUTO: 7.9 FL (ref 5–10.5)
POTASSIUM SERPL-SCNC: 4.1 MMOL/L (ref 3.5–5.1)
PROT SERPL-MCNC: 7.2 G/DL (ref 6.4–8.2)
RBC # BLD AUTO: 4.51 M/UL (ref 4–5.2)
SODIUM SERPL-SCNC: 143 MMOL/L (ref 136–145)
TRIGL SERPL-MCNC: 86 MG/DL (ref 0–150)
TSH SERPL DL<=0.005 MIU/L-ACNC: 1.1 UIU/ML (ref 0.27–4.2)
VLDLC SERPL CALC-MCNC: 17 MG/DL
WBC # BLD AUTO: 5.3 K/UL (ref 4–11)

## 2025-07-15 PROCEDURE — 82306 VITAMIN D 25 HYDROXY: CPT

## 2025-07-15 PROCEDURE — 84443 ASSAY THYROID STIM HORMONE: CPT

## 2025-07-15 PROCEDURE — 80061 LIPID PANEL: CPT

## 2025-07-15 PROCEDURE — 80165 DIPROPYLACETIC ACID FREE: CPT

## 2025-07-15 PROCEDURE — 85025 COMPLETE CBC W/AUTO DIFF WBC: CPT

## 2025-07-15 PROCEDURE — 80164 ASSAY DIPROPYLACETIC ACD TOT: CPT

## 2025-07-15 PROCEDURE — 36415 COLL VENOUS BLD VENIPUNCTURE: CPT

## 2025-07-15 PROCEDURE — 80053 COMPREHEN METABOLIC PANEL: CPT

## 2025-07-17 LAB
VALPROATE FREE MFR SERPL: 13 % (ref 5–18)
VALPROATE FREE SERPL-MCNC: 8 UG/ML (ref 7–23)
VALPROATE SERPL-MCNC: 60 UG/ML (ref 50–125)

## 2025-07-31 ENCOUNTER — TELEPHONE (OUTPATIENT)
Dept: FAMILY MEDICINE CLINIC | Age: 60
End: 2025-07-31

## 2025-07-31 DIAGNOSIS — R46.89 BEHAVIORAL CHANGE: ICD-10-CM

## 2025-07-31 DIAGNOSIS — Z91.81 HISTORY OF FALL: ICD-10-CM

## 2025-07-31 NOTE — TELEPHONE ENCOUNTER
Tori called from [facility] stating the Dept that monitors the orders for their patients need the tylenol clarified with better parameters, for example when to give tylenol like at onset of pain, headache, bodyaches, fever, and if no headahce or pain or fever stop tylenol or if still having the symptoms how many hours in between doses,  also the ibuprofen needs clarified on when it can be given and when to alternate it with the tylenol or if you want to dc the ibuprofen that would be ok too since they do not give that to her very often.  Just needs to be clarified in the orders. I did tell her Tamiko will be out till Friday.  She would like the orders faxed to her [phone]

## 2025-08-01 RX ORDER — ACETAMINOPHEN 325 MG/1
TABLET ORAL
Qty: 60 TABLET | Refills: 11 | Status: SHIPPED | OUTPATIENT
Start: 2025-08-01

## 2025-08-01 RX ORDER — IBUPROFEN 200 MG
TABLET ORAL
Qty: 60 TABLET | Refills: 11 | Status: SHIPPED | OUTPATIENT
Start: 2025-08-01 | End: 2025-08-01

## 2025-08-01 RX ORDER — IBUPROFEN 200 MG
TABLET ORAL
Qty: 60 TABLET | Refills: 11 | Status: SHIPPED | OUTPATIENT
Start: 2025-08-01

## 2025-08-01 RX ORDER — ACETAMINOPHEN 325 MG/1
TABLET ORAL
Qty: 60 TABLET | Refills: 11 | Status: SHIPPED | OUTPATIENT
Start: 2025-08-01 | End: 2025-08-01

## 2025-08-04 ENCOUNTER — TELEPHONE (OUTPATIENT)
Dept: FAMILY MEDICINE CLINIC | Age: 60
End: 2025-08-04

## 2025-08-04 DIAGNOSIS — Z91.81 HISTORY OF FALL: ICD-10-CM

## 2025-08-04 DIAGNOSIS — R46.89 BEHAVIORAL CHANGE: ICD-10-CM

## 2025-08-05 RX ORDER — ACETAMINOPHEN 325 MG/1
TABLET ORAL
Qty: 60 TABLET | Refills: 11 | Status: SHIPPED | OUTPATIENT
Start: 2025-08-05

## 2025-08-05 RX ORDER — IBUPROFEN 200 MG
TABLET ORAL
Qty: 60 TABLET | Refills: 11 | Status: SHIPPED | OUTPATIENT
Start: 2025-08-05

## 2025-08-14 ENCOUNTER — TELEPHONE (OUTPATIENT)
Dept: FAMILY MEDICINE CLINIC | Age: 60
End: 2025-08-14

## 2025-08-14 DIAGNOSIS — R41.0 CONFUSION: Primary | ICD-10-CM

## 2025-08-20 ENCOUNTER — HOSPITAL ENCOUNTER (OUTPATIENT)
Age: 60
Setting detail: SPECIMEN
Discharge: HOME | End: 2025-08-20
Payer: MEDICARE

## 2025-08-20 ENCOUNTER — TELEPHONE (OUTPATIENT)
Dept: FAMILY MEDICINE CLINIC | Age: 60
End: 2025-08-20

## 2025-08-20 DIAGNOSIS — R41.0 CONFUSION: ICD-10-CM

## 2025-08-20 LAB
BACTERIA URNS QL MICRO: ABNORMAL /HPF
BILIRUB UR QL STRIP.AUTO: NEGATIVE
CLARITY UR: CLEAR
COLOR UR: YELLOW
EPI CELLS #/AREA URNS HPF: ABNORMAL /HPF (ref 0–5)
GLUCOSE UR STRIP.AUTO-MCNC: NEGATIVE MG/DL
HGB UR QL STRIP.AUTO: NEGATIVE
KETONES UR STRIP.AUTO-MCNC: NEGATIVE MG/DL
LEUKOCYTE ESTERASE UR QL STRIP.AUTO: ABNORMAL
MUCOUS THREADS #/AREA URNS LPF: ABNORMAL /LPF
NITRITE UR QL STRIP.AUTO: NEGATIVE
PH UR STRIP.AUTO: 6.5 [PH] (ref 5–8)
PROT UR STRIP.AUTO-MCNC: NEGATIVE MG/DL
RBC #/AREA URNS HPF: ABNORMAL /HPF (ref 0–4)
SP GR UR STRIP.AUTO: 1.01 (ref 1–1.03)
UA DIPSTICK W REFLEX MICRO PNL UR: YES
URN SPEC COLLECT METH UR: ABNORMAL
UROBILINOGEN UR STRIP-ACNC: 0.2 E.U./DL
WBC #/AREA URNS HPF: ABNORMAL /HPF (ref 0–5)

## 2025-08-20 PROCEDURE — 87640 STAPH A DNA AMP PROBE: CPT

## 2025-08-20 PROCEDURE — 87653 STREP B DNA AMP PROBE: CPT

## 2025-08-20 PROCEDURE — 81001 URINALYSIS AUTO W/SCOPE: CPT

## 2025-08-20 PROCEDURE — 87798 DETECT AGENT NOS DNA AMP: CPT

## 2025-08-20 PROCEDURE — 87651 STREP A DNA AMP PROBE: CPT

## 2025-08-22 ENCOUNTER — RESULTS FOLLOW-UP (OUTPATIENT)
Dept: FAMILY MEDICINE CLINIC | Age: 60
End: 2025-08-22

## 2025-08-22 DIAGNOSIS — R46.89 BEHAVIORAL CHANGE: ICD-10-CM

## 2025-08-22 DIAGNOSIS — R39.9 UTI SYMPTOMS: ICD-10-CM

## 2025-08-22 LAB
A BAUMANNII DNA SPEC QL NAA+PROBE: NOT DETECTED
CARBAPENEM RESISTANCE OXA-48 GENE BY PCR: NOT DETECTED
CEPHALOSPORIN RESISTANCE AMPC GENE: NOT DETECTED
ENTEROCOC DNA SPEC QL NAA+PROBE: NOT DETECTED
ESBL RESISTANCE: NOT DETECTED
GP B STREP DNA SPEC QL NAA+PROBE: NOT DETECTED
K PNEUMON DNA SPEC QL NAA+PROBE: NOT DETECTED
MACROLIDE RESISTANCE: NOT DETECTED
METHICILLIN RESISTANCE: NOT DETECTED
MRSA DNA SPEC QL NAA+PROBE: NOT DETECTED
OTHER MICROORG DNA SPEC QL NAA+PROBE: NOT DETECTED
P AERUGINOSA DNA SPEC QL NAA+PROBE: NOT DETECTED
P MIRABILIS DNA SPEC QL NAA+PROBE: NORMAL
QUINOLONE AND FLUOROQUINOLONE RESISTANCE: NOT DETECTED
S PYO RRNA SPEC QL PROBE: NOT DETECTED
TETRACYCLINE RESISTANCE: NORMAL
TRIMETHOPRIM/SULFONAMIDE RESISTANCE: NOT DETECTED

## 2025-08-22 RX ORDER — AMOXICILLIN 200 MG/5ML
500 POWDER, FOR SUSPENSION ORAL 3 TIMES DAILY
Qty: 187.5 ML | Refills: 0 | Status: SHIPPED | OUTPATIENT
Start: 2025-08-22 | End: 2025-08-27

## 2025-08-26 ENCOUNTER — TELEPHONE (OUTPATIENT)
Dept: FAMILY MEDICINE CLINIC | Age: 60
End: 2025-08-26

## 2025-09-04 ENCOUNTER — PATIENT MESSAGE (OUTPATIENT)
Dept: FAMILY MEDICINE CLINIC | Age: 60
End: 2025-09-04

## 2025-09-04 DIAGNOSIS — R19.7 DIARRHEA, UNSPECIFIED TYPE: ICD-10-CM

## 2025-09-05 DIAGNOSIS — F79 INTELLECTUAL DISABILITY: ICD-10-CM

## 2025-09-05 DIAGNOSIS — F42.8 OTHER OBSESSIVE-COMPULSIVE DISORDERS: ICD-10-CM

## 2025-09-05 DIAGNOSIS — F42.4 COMPULSIVE SKIN PICKING: ICD-10-CM

## 2025-09-05 DIAGNOSIS — F20.9 SCHIZOPHRENIA, UNSPECIFIED TYPE (HCC): ICD-10-CM

## 2025-09-05 RX ORDER — MEMANTINE HYDROCHLORIDE 2 MG/ML
SOLUTION ORAL
Qty: 360 ML | Refills: 11 | Status: SHIPPED | OUTPATIENT
Start: 2025-09-05